# Patient Record
Sex: FEMALE | Race: WHITE | HISPANIC OR LATINO | Employment: UNEMPLOYED | URBAN - METROPOLITAN AREA
[De-identification: names, ages, dates, MRNs, and addresses within clinical notes are randomized per-mention and may not be internally consistent; named-entity substitution may affect disease eponyms.]

---

## 2019-07-29 ENCOUNTER — OFFICE VISIT (OUTPATIENT)
Dept: FAMILY MEDICINE | Facility: CLINIC | Age: 29
End: 2019-07-29
Payer: OTHER GOVERNMENT

## 2019-07-29 VITALS
WEIGHT: 109 LBS | BODY MASS INDEX: 20.06 KG/M2 | OXYGEN SATURATION: 98 % | DIASTOLIC BLOOD PRESSURE: 68 MMHG | HEIGHT: 62 IN | HEART RATE: 72 BPM | SYSTOLIC BLOOD PRESSURE: 98 MMHG

## 2019-07-29 DIAGNOSIS — Z34.90 PREGNANCY, UNSPECIFIED GESTATIONAL AGE: Primary | ICD-10-CM

## 2019-07-29 DIAGNOSIS — R30.0 DYSURIA: ICD-10-CM

## 2019-07-29 DIAGNOSIS — Z13.9 SCREENING PROCEDURE: ICD-10-CM

## 2019-07-29 PROCEDURE — 99999 PR PBB SHADOW E&M-NEW PATIENT-LVL IV: CPT | Mod: PBBFAC,,, | Performed by: NURSE PRACTITIONER

## 2019-07-29 PROCEDURE — 99999 PR PBB SHADOW E&M-NEW PATIENT-LVL IV: ICD-10-PCS | Mod: PBBFAC,,, | Performed by: NURSE PRACTITIONER

## 2019-07-29 PROCEDURE — 99204 OFFICE O/P NEW MOD 45 MIN: CPT | Mod: PBBFAC | Performed by: NURSE PRACTITIONER

## 2019-07-29 PROCEDURE — 99203 PR OFFICE/OUTPT VISIT, NEW, LEVL III, 30-44 MIN: ICD-10-PCS | Mod: S$PBB,,, | Performed by: NURSE PRACTITIONER

## 2019-07-29 PROCEDURE — 99203 OFFICE O/P NEW LOW 30 MIN: CPT | Mod: S$PBB,,, | Performed by: NURSE PRACTITIONER

## 2019-07-29 NOTE — PROGRESS NOTES
SUBJECTIVE:      Patient ID: Leydi Pitt is a 29 y.o. female.    Chief Complaint: Establish Care    Pt presents as new pt for referral to OB/Gyn for new pregnancy. Last period 5/21/2019. Had pregnancy test positive in the hospital June of this year. She does report a history of L oophorectomy. She had an ultrasound already and showed a viable pregnancy which gives an estimated due date of 2/23/2020. Reports some dysuria which has been present prior to pregnancy. Denies hematuria, frequency, urgency, or foul smelling urine. She reports minimal dizziness at times after standing long periods which resolves after sitting. Denies syncope or loss of consciousness. Her  works at the [employer] in [place] and they are wanting to move here and get their prenatal and pediatric care in [place]. She speaks English and understands English but her  helps with difficult wording as needed. Denies CP, SOB, nausea, vomiting, diarrhea, constipation, abdominal pain, or any other concerns at this time.       Past Surgical History:   Procedure Laterality Date    HEMORRHOID SURGERY      OOPHORECTOMY Left 12/26/2018           Family History   Problem Relation Age of Onset    Hypertension Mother       Social History     Socioeconomic History    Marital status:      Spouse name: Not on file    Number of children: Not on file    Years of education: Not on file    Highest education level: Not on file   Occupational History    Not on file   Social Needs    Financial resource strain: Not on file    Food insecurity:     Worry: Not on file     Inability: Not on file    Transportation needs:     Medical: Not on file     Non-medical: Not on file   Tobacco Use    Smoking status: Never Smoker    Smokeless tobacco: Never Used   Substance and Sexual Activity    Alcohol use: Yes    Drug use: Never    Sexual activity: Yes     Partners: Male   Lifestyle    Physical activity:     Days per week: Not on  file     Minutes per session: Not on file    Stress: Only a little   Relationships    Social connections:     Talks on phone: Not on file     Gets together: Not on file     Attends Sabianism service: Not on file     Active member of club or organization: Not on file     Attends meetings of clubs or organizations: Not on file     Relationship status: Not on file   Other Topics Concern    Not on file   Social History Narrative    Not on file     Current Outpatient Medications   Medication Sig Dispense Refill    prenatal vit/iron fum/folic ac (PRENATAL 1+1 ORAL) Take by mouth.       No current facility-administered medications for this visit.      Review of patient's allergies indicates:  No Known Allergies   Past Medical History:   Diagnosis Date    Anemia     Endometriosis     Pelvic pain      Past Surgical History:   Procedure Laterality Date    HEMORRHOID SURGERY      OOPHORECTOMY Left 12/26/2018             Review of Systems   Constitutional: Negative for activity change, appetite change, chills, fatigue, fever and unexpected weight change.   HENT: Negative for congestion, ear pain, postnasal drip, rhinorrhea, sinus pain and sore throat.    Eyes: Negative for pain, discharge and visual disturbance.   Respiratory: Negative for cough, chest tightness, shortness of breath and wheezing.    Cardiovascular: Negative for chest pain, palpitations and leg swelling.   Gastrointestinal: Negative for abdominal distention, abdominal pain, blood in stool, constipation, diarrhea, nausea and vomiting.   Genitourinary: Positive for dysuria. Negative for difficulty urinating, flank pain, frequency, hematuria, pelvic pain, urgency, vaginal bleeding, vaginal discharge and vaginal pain.   Musculoskeletal: Negative for back pain, joint swelling and myalgias.   Skin: Negative for color change, rash and wound.   Neurological: Positive for dizziness. Negative for seizures, syncope, weakness, light-headedness and headaches.  "  Hematological: Negative for adenopathy.   Psychiatric/Behavioral: Negative for agitation, confusion and hallucinations. The patient is not nervous/anxious.       OBJECTIVE:      Vitals:    07/29/19 0835   BP: 98/68   Pulse: 72   SpO2: 98%   Weight: 49.4 kg (109 lb)   Height: 5' 2" (1.575 m)     Physical Exam   Constitutional: She is oriented to person, place, and time. She appears well-developed and well-nourished. No distress.   HENT:   Head: Normocephalic and atraumatic.   Right Ear: Hearing, tympanic membrane, external ear and ear canal normal.   Left Ear: Hearing, tympanic membrane, external ear and ear canal normal.   Nose: Nose normal. No mucosal edema.   Mouth/Throat: Uvula is midline, oropharynx is clear and moist and mucous membranes are normal. No oropharyngeal exudate.   Eyes: Pupils are equal, round, and reactive to light. Conjunctivae, EOM and lids are normal. Right eye exhibits no discharge. Left eye exhibits no discharge. No scleral icterus.   Neck: Trachea normal, normal range of motion, full passive range of motion without pain and phonation normal. Neck supple. Carotid bruit is not present. No tracheal deviation present. No thyromegaly present.   Cardiovascular: Normal rate, regular rhythm, normal heart sounds and intact distal pulses. Exam reveals no gallop and no friction rub.   No murmur heard.  Pulmonary/Chest: Effort normal and breath sounds normal. No stridor. No respiratory distress. She has no decreased breath sounds. She has no wheezes. She has no rales.   Abdominal: Soft. Normal appearance and bowel sounds are normal. There is no tenderness.   Musculoskeletal: Normal range of motion. She exhibits no edema.   Lymphadenopathy:     She has no cervical adenopathy.        Right: No supraclavicular adenopathy present.        Left: No supraclavicular adenopathy present.   Neurological: She is alert and oriented to person, place, and time. She has normal strength. No sensory deficit.   Skin: " Skin is warm, dry and intact. Capillary refill takes less than 2 seconds. She is not diaphoretic.   Psychiatric: She has a normal mood and affect. Her speech is normal and behavior is normal. Judgment and thought content normal. Cognition and memory are normal. She expresses no suicidal plans.   Vitals reviewed.     Assessment:       1. Pregnancy, unspecified gestational age    2. Dysuria    3. Screening procedure        Plan:       Pregnancy, unspecified gestational age  Reports positive pregnancy test and records of US proven viable pregnancy with due date 2/23/2020. Referral placed to OB/Gyn and baseline labs ordered.  -     Ambulatory Referral to Obstetrics / Gynecology  -     CBC auto differential; Future; Expected date: 07/29/2019  -     Comprehensive metabolic panel; Future; Expected date: 07/29/2019    Dysuria  Will check UA for presence of UTI with history of dysuria.  -     Urinalysis; Future; Expected date: 07/29/2019    Screening procedure  Will call with results and she can follow up in 1 year for wellness or earlier if any issues.  -     CBC auto differential; Future; Expected date: 07/29/2019  -     Comprehensive metabolic panel; Future; Expected date: 07/29/2019        Follow up in about 1 year (around 7/29/2020) for Annual Well Check.      7/29/2019 Yumiko Carreno, DELMAR, FNP

## 2019-08-01 LAB
ABO + RH BLD: NORMAL
C TRACH RRNA SPEC QL PROBE: NEGATIVE
HBV SURFACE AG SERPL QL IA: NEGATIVE
INDIRECT COOMBS: NEGATIVE
N GONORRHOEAE, AMPLIFIED DNA: NEGATIVE
RPR: NORMAL
RUBELLA IMMUNE STATUS: NORMAL

## 2019-11-09 ENCOUNTER — HOSPITAL ENCOUNTER (OUTPATIENT)
Facility: HOSPITAL | Age: 29
Discharge: HOME | End: 2019-11-09
Attending: SPECIALIST | Admitting: SPECIALIST
Payer: OTHER GOVERNMENT

## 2019-11-09 VITALS
TEMPERATURE: 99 F | SYSTOLIC BLOOD PRESSURE: 120 MMHG | HEART RATE: 97 BPM | DIASTOLIC BLOOD PRESSURE: 74 MMHG | RESPIRATION RATE: 18 BRPM

## 2019-11-09 DIAGNOSIS — O36.8190 DECREASED FETAL MOVEMENT: ICD-10-CM

## 2019-11-09 PROCEDURE — G0378 HOSPITAL OBSERVATION PER HR: HCPCS

## 2019-11-09 PROCEDURE — 99211 OFF/OP EST MAY X REQ PHY/QHP: CPT

## 2019-11-09 RX ORDER — GUAIFENESIN/DEXTROMETHORPHAN 100-10MG/5
5 SYRUP ORAL
COMMUNITY
End: 2021-01-14

## 2019-11-10 NOTE — DISCHARGE INSTRUCTIONS
Keep your scheduled appointment with your provider.    Call your Doctor if you have any of the following:  Temperature above 100 degrees  Nausea, vomiting and/or diarrhea  Severe headache, dizziness, or blurred vision  Notable increase in swelling of hands or feet  Notable swelling of face and lips  Difficulty, pain or burning with urination  Foul smelling vaginal discharge  Decreased fetal movement    Come to the hospital if you have any of the following symptoms:  Your water breaks  More than 4-6 contractions in 1 hour for 2 or more hours  Vaginal bleeding like a period    After 28 weeks, you should feel 10 distinct fetal movements within a 2 hour period.    It is recommended that you drink 1/2 a gallon of water each day.  Tea, Soda and Juice are  in addition to this.  Recuento de patadas    Es normal que le preocupe la latricia de del rio bebé. Para saber si el bebé está jaspreet, mitra de las cosas que puede hacer es anotar los movimientos del bebé mitra vez al día. [place] es lo que se llama recuento de patadas. Recuerde llevar jae anotaciones con el recuento de patadas del bebé a todas jae citas con del rio proveedor de atención médica.   Cómo contar las patadas de del rio bebé  Aquí tiene algunos consejos para contar las patadas de del rio bebé:  · Escoja mitra hora cuando el bebé esté activo, katie por ejemplo después de mitra comida.  · Siéntese cómodamente o acuéstese de lado.  · La primera vez que el bebé se mueva, anote la hora.  · Cuente cada movimiento hasta que el bebé se haya movido misti veces. ([place] puede llevar entre veinte minutos y dos horas).  · Trate de hacer esto a la misma hora todos los días.  Cuándo llamar a del rio proveedor de atención médica  Llame a del rio proveedor de atención médica de inmediato en cualquiera de los siguientes casos:   · Si el bebé se mueve menos de misti veces en dos horas mientras usted está llevando la cuenta de las pataditas.  · Si el bebé se mueve con mucha menos frecuencia que en días anteriores.  · Si usted  no meza sentido movimientos del bebé en todo el día.  Date Last Reviewed: 8/5/2015  © 2966-9652 [employer]. [address] [place] [address], [place] [address]. Todos los derechos reservados. Esta información no pretende sustituir la atención médica profesional. Sólo del rio médico puede diagnosticar y tratar un problema de latricia.

## 2019-12-26 LAB — PRENATAL STREP B CULTURE: NEGATIVE

## 2020-01-10 ENCOUNTER — HOSPITAL ENCOUNTER (OUTPATIENT)
Facility: HOSPITAL | Age: 30
Discharge: HOME | End: 2020-01-10
Attending: SPECIALIST | Admitting: SPECIALIST
Payer: OTHER GOVERNMENT

## 2020-01-10 VITALS — HEART RATE: 77 BPM | SYSTOLIC BLOOD PRESSURE: 112 MMHG | DIASTOLIC BLOOD PRESSURE: 69 MMHG

## 2020-01-10 DIAGNOSIS — O36.8190 DECREASED FETAL MOVEMENT: ICD-10-CM

## 2020-01-10 PROCEDURE — 59025 FETAL NON-STRESS TEST: CPT

## 2020-01-10 NOTE — DISCHARGE INSTRUCTIONS
Keep your scheduled appointment with your provider.    Call your Doctor if you have any of the following:  Temperature above 100 degrees  Nausea, vomiting and/or diarrhea  Severe headache, dizziness, or blurred vision  Notable increase in swelling of hands or feet  Notable swelling of face and lips  Difficulty, pain or burning with urination  Foul smelling vaginal discharge  Decreased fetal movement    Come to the hospital if you have any of the following symptoms:    Las pruebas de tolerancia a las contracciones, con estrés y sin estrés, son mitra manera sencilla de comprobar el bienestar de del rio bebé y saber si está obteniendo suficiente oxígeno y nutrición de la riley de la madre. Estas pruebas permiten a del rio proveedor de atención médica determinar si es preferible inducir el parto inmediatamente o esperar.     Consulte con del rio proveedor de atención médica cualquier pregunta que pueda tener acerca de estos procedimientos.      ¿En qué consiste esta prueba?  · En el consultorio de del rio proveedor de atención médica o en el hospital, usted se acuesta boca arriba o de lado, o se reclina en mitra silla.  · Le colocarán un sensor de latidos fetales alrededor del estómago, sostenido por un cinturón o mitra garg, sobre la karan donde los latidos cardíacos del bebé son más perceptibles.  · Le colocarán otro dispositivo sobre del rio estómago, también sostenido por otro cinturón o gagr, para medir las contracciones de del rio útero.  Prueba sin estrés  La prueba sin estrés permite que del rio proveedor de atención médica mida la frecuencia cardíaca del bebé. Si la frecuencia cardíaca aumenta normalmente preeti la prueba, es señal de que el bebé probablemente esté obteniendo suficiente oxígeno y nutrición de la riley de la madre.  Prueba de tolerancia a las contracciones con estrés  Mediante la prueba estresante, del rio proveedor de atención médica puede determinar si la frecuencia cardíaca del feto responde de manera normal a contracciones  uterinas suaves similares a las del parto, lo cual permite predecir cómo reaccionaría ante el estrés del parto.  Eleazar la prueba de tolerancia a las contracciones con estrés  Para estimular contracciones suaves del útero, del rio proveedor de atención médica puede darle medicamentos por vía intravenosa. Oxitocina es el medicamento que se usa habitualmente.  Date Last Reviewed: 8/5/2015  © 5467-4048 [employer]. [address] [place] [address]. Todos los derechos reservados. Esta información no pretende sustituir la atención médica profesional. Sólo del rio médico puede diagnosticar y tratar un problema de latricia.            Your water breaks  More than 4-6 contractions in 1 hour for 2 or more hours  Vaginal bleeding like a period    After 28 weeks, you should feel 10 distinct fetal movements within a 2 hour period.    It is recommended that you drink 1/2 a gallon of water each day.  Tea, Soda and Juice are  in addition to this.

## 2020-01-10 NOTE — NURSING
Pt here for NST from [place] office. Pt states that she is not feeling much fetal movement.    Patient placed on monitor, explained the NST process.     1306- Dr. Roberts called and reviewed strip,. Gave orders for discharge.     Have patient keep scheduled appts and to schedule on NST for next Tuesday. Will review from there.     Instructions given to patient. Pt verbalized understanding

## 2020-01-14 ENCOUNTER — HOSPITAL ENCOUNTER (OUTPATIENT)
Facility: HOSPITAL | Age: 30
Discharge: HOME | End: 2020-01-14
Attending: SPECIALIST | Admitting: SPECIALIST
Payer: OTHER GOVERNMENT

## 2020-01-14 VITALS — SYSTOLIC BLOOD PRESSURE: 110 MMHG | RESPIRATION RATE: 16 BRPM | HEART RATE: 118 BPM | DIASTOLIC BLOOD PRESSURE: 74 MMHG

## 2020-01-14 DIAGNOSIS — O36.8190 DECREASED FETAL MOVEMENT: ICD-10-CM

## 2020-01-14 PROCEDURE — 59025 FETAL NON-STRESS TEST: CPT

## 2020-01-30 ENCOUNTER — ANESTHESIA (OUTPATIENT)
Dept: OBSTETRICS AND GYNECOLOGY | Facility: HOSPITAL | Age: 30
End: 2020-01-30
Payer: OTHER GOVERNMENT

## 2020-01-30 ENCOUNTER — HOSPITAL ENCOUNTER (INPATIENT)
Facility: HOSPITAL | Age: 30
LOS: 2 days | Discharge: HOME | End: 2020-02-01
Attending: SPECIALIST | Admitting: SPECIALIST
Payer: OTHER GOVERNMENT

## 2020-01-30 DIAGNOSIS — Z34.90 ENCOUNTER FOR INDUCTION OF LABOR: Primary | ICD-10-CM

## 2020-01-30 LAB
ABO + RH BLD: NORMAL
AMPHET+METHAMPHET UR QL: NEGATIVE
BACTERIA #/AREA URNS HPF: NEGATIVE /HPF
BARBITURATES UR QL SCN>200 NG/ML: NEGATIVE
BASOPHILS # BLD AUTO: 0.03 K/UL (ref 0–0.2)
BASOPHILS NFR BLD: 0.3 % (ref 0–1.9)
BENZODIAZ UR QL SCN>200 NG/ML: NEGATIVE
BILIRUB UR QL STRIP: NEGATIVE
BLD GP AB SCN CELLS X3 SERPL QL: NORMAL
BZE UR QL SCN: NEGATIVE
CANNABINOIDS UR QL SCN: NEGATIVE
CLARITY UR: CLEAR
COLOR UR: YELLOW
CREAT UR-MCNC: 92 MG/DL (ref 15–325)
DIFFERENTIAL METHOD: ABNORMAL
EOSINOPHIL # BLD AUTO: 0.3 K/UL (ref 0–0.5)
EOSINOPHIL NFR BLD: 2.6 % (ref 0–8)
ERYTHROCYTE [DISTWIDTH] IN BLOOD BY AUTOMATED COUNT: 14.4 % (ref 11.5–14.5)
GLUCOSE UR QL STRIP: ABNORMAL
HCT VFR BLD AUTO: 33.5 % (ref 37–48.5)
HGB BLD-MCNC: 11.1 G/DL (ref 12–16)
HGB UR QL STRIP: NEGATIVE
HYALINE CASTS #/AREA URNS LPF: 5 /LPF
IMM GRANULOCYTES # BLD AUTO: 0.08 K/UL (ref 0–0.04)
IMM GRANULOCYTES NFR BLD AUTO: 0.8 % (ref 0–0.5)
KETONES UR QL STRIP: NEGATIVE
LEUKOCYTE ESTERASE UR QL STRIP: ABNORMAL
LYMPHOCYTES # BLD AUTO: 2.2 K/UL (ref 1–4.8)
LYMPHOCYTES NFR BLD: 22.9 % (ref 18–48)
MCH RBC QN AUTO: 31.9 PG (ref 27–31)
MCHC RBC AUTO-ENTMCNC: 33.1 G/DL (ref 32–36)
MCV RBC AUTO: 96 FL (ref 82–98)
MICROSCOPIC COMMENT: ABNORMAL
MONOCYTES # BLD AUTO: 1 K/UL (ref 0.3–1)
MONOCYTES NFR BLD: 10.6 % (ref 4–15)
NEUTROPHILS # BLD AUTO: 6 K/UL (ref 1.8–7.7)
NEUTROPHILS NFR BLD: 62.8 % (ref 38–73)
NITRITE UR QL STRIP: NEGATIVE
NRBC BLD-RTO: 0 /100 WBC
OPIATES UR QL SCN: NEGATIVE
PCP UR QL SCN>25 NG/ML: NEGATIVE
PCP UR QL SCN>25 NG/ML: NEGATIVE
PH UR STRIP: 6 [PH] (ref 5–8)
PLATELET # BLD AUTO: 212 K/UL (ref 150–350)
PMV BLD AUTO: 11.5 FL (ref 9.2–12.9)
PROT UR QL STRIP: NEGATIVE
RBC # BLD AUTO: 3.48 M/UL (ref 4–5.4)
RBC #/AREA URNS HPF: 0 /HPF (ref 0–4)
RPR SER QL: NORMAL
SP GR UR STRIP: 1.01 (ref 1–1.03)
SQUAMOUS #/AREA URNS HPF: 3 /HPF
TOXICOLOGY INFORMATION: NORMAL
URN SPEC COLLECT METH UR: ABNORMAL
UROBILINOGEN UR STRIP-ACNC: NEGATIVE EU/DL
WBC # BLD AUTO: 9.6 K/UL (ref 3.9–12.7)
WBC #/AREA URNS HPF: 8 /HPF (ref 0–5)

## 2020-01-30 PROCEDURE — 12000002 HC ACUTE/MED SURGE SEMI-PRIVATE ROOM

## 2020-01-30 PROCEDURE — 25000003 PHARM REV CODE 250

## 2020-01-30 PROCEDURE — 62326 NJX INTERLAMINAR LMBR/SAC: CPT | Performed by: ANESTHESIOLOGY

## 2020-01-30 PROCEDURE — 86850 RBC ANTIBODY SCREEN: CPT

## 2020-01-30 PROCEDURE — 85025 COMPLETE CBC W/AUTO DIFF WBC: CPT

## 2020-01-30 PROCEDURE — 80307 DRUG TEST PRSMV CHEM ANLYZR: CPT

## 2020-01-30 PROCEDURE — 63600175 PHARM REV CODE 636 W HCPCS: Performed by: SPECIALIST

## 2020-01-30 PROCEDURE — 86592 SYPHILIS TEST NON-TREP QUAL: CPT

## 2020-01-30 PROCEDURE — 81001 URINALYSIS AUTO W/SCOPE: CPT

## 2020-01-30 PROCEDURE — 72200004 HC VAGINAL DELIVERY LEVEL I

## 2020-01-30 RX ORDER — FENTANYL/BUPIVACAINE/NS/PF 2-625MCG/1
PLASTIC BAG, INJECTION (ML) INJECTION CONTINUOUS
Status: DISCONTINUED | OUTPATIENT
Start: 2020-01-30 | End: 2020-01-31

## 2020-01-30 RX ORDER — BUTORPHANOL TARTRATE 2 MG/ML
1 INJECTION INTRAMUSCULAR; INTRAVENOUS EVERY 4 HOURS PRN
Status: DISCONTINUED | OUTPATIENT
Start: 2020-01-30 | End: 2020-01-31

## 2020-01-30 RX ORDER — FENTANYL/BUPIVACAINE/NS/PF 2-625MCG/1
PLASTIC BAG, INJECTION (ML) INJECTION
Status: COMPLETED
Start: 2020-01-30 | End: 2020-01-30

## 2020-01-30 RX ORDER — ONDANSETRON 2 MG/ML
4 INJECTION INTRAMUSCULAR; INTRAVENOUS ONCE
Status: DISCONTINUED | OUTPATIENT
Start: 2020-01-30 | End: 2020-01-31

## 2020-01-30 RX ORDER — NALOXONE HYDROCHLORIDE 1 MG/ML
0.4 INJECTION INTRAMUSCULAR; INTRAVENOUS; SUBCUTANEOUS SEE ADMIN INSTRUCTIONS
Status: DISCONTINUED | OUTPATIENT
Start: 2020-01-30 | End: 2020-01-31

## 2020-01-30 RX ORDER — SODIUM CHLORIDE, SODIUM LACTATE, POTASSIUM CHLORIDE, CALCIUM CHLORIDE 600; 310; 30; 20 MG/100ML; MG/100ML; MG/100ML; MG/100ML
INJECTION, SOLUTION INTRAVENOUS CONTINUOUS
Status: DISCONTINUED | OUTPATIENT
Start: 2020-01-30 | End: 2020-01-31

## 2020-01-30 RX ORDER — DIPHENHYDRAMINE HYDROCHLORIDE 50 MG/ML
12.5 INJECTION INTRAMUSCULAR; INTRAVENOUS EVERY 4 HOURS PRN
Status: DISCONTINUED | OUTPATIENT
Start: 2020-01-30 | End: 2020-01-31

## 2020-01-30 RX ORDER — EPHEDRINE SULFATE 50 MG/ML
10 INJECTION, SOLUTION INTRAVENOUS ONCE AS NEEDED
Status: DISCONTINUED | OUTPATIENT
Start: 2020-01-30 | End: 2020-01-31

## 2020-01-30 RX ORDER — OXYTOCIN-SODIUM CHLORIDE 0.9% IV SOLN 30 UNIT/500ML 30-0.9/5 UT/ML-%
2 SOLUTION INTRAVENOUS CONTINUOUS
Status: DISCONTINUED | OUTPATIENT
Start: 2020-01-30 | End: 2020-01-31

## 2020-01-30 RX ADMIN — Medication 2 MILLI-UNITS/MIN: at 02:01

## 2020-01-30 RX ADMIN — Medication 250 ML: at 08:01

## 2020-01-30 RX ADMIN — SODIUM CHLORIDE, SODIUM LACTATE, POTASSIUM CHLORIDE, AND CALCIUM CHLORIDE 1000 ML: .6; .31; .03; .02 INJECTION, SOLUTION INTRAVENOUS at 07:01

## 2020-01-30 RX ADMIN — SODIUM CHLORIDE, SODIUM LACTATE, POTASSIUM CHLORIDE, AND CALCIUM CHLORIDE: .6; .31; .03; .02 INJECTION, SOLUTION INTRAVENOUS at 02:01

## 2020-01-30 NOTE — SUBJECTIVE & OBJECTIVE
Interval History:  Leydi is a 29 y.o.  at 39w6d. She is doing well. Resting comfortable with epidural in place    Objective:     Vital Signs (Most Recent):  Temp: 97.9 °F (36.6 °C) (20 1051)  Pulse: 83 (20 1151)  Resp: 18 (20 1122)  BP: (!) 90/54 (20 1151) Vital Signs (24h Range):  Temp:  [97.8 °F (36.6 °C)-97.9 °F (36.6 °C)] 97.9 °F (36.6 °C)  Pulse:  [63-90] 83  Resp:  [16-20] 18  BP: ()/(44-66) 90/54     Weight: 63.5 kg (140 lb)  Body mass index is 25.61 kg/m².    FHT:  Baseline 140s and reassuring   TOCO: Not recording well    Cervical Exam:  Dilation:  3  Effacement:  60%  Station: -3  Presentation: Vertex     Significant Labs:  Lab Results   Component Value Date    GROUPTRH A POS 2020    HEPBSAG Negative 2019    STREPBCULT Negative 2019       I have personallly reviewed all pertinent lab results from the last 24 hours.    Physical Exam

## 2020-01-30 NOTE — PROGRESS NOTES
[facility]  Obstetrics  Labor Progress Note    Patient Name: Leydi Pitt  MRN: [id number]  Admission Date: 2020  Hospital Length of Stay: 0 days  Attending Physician: Nicolas Roberts MD  Primary Care Provider: SUDHAKAR Billy    Subjective:     Principal Problem:Encounter for induction of labor    Hospital Course:  29-year-old  at 39 weeks and 6 days gestational age admitted for induction of labor    Interval History:  Leydi is a 29 y.o.  at 39w6d. She is doing well. Patient now status post epidural and comfortable    Objective:     Vital Signs (Most Recent):  Temp: 97.9 °F (36.6 °C) (20 0701)  Pulse: 68 (20 0810)  Resp: 20 (20 0810)  BP: (!) 99/55 (20 0810) Vital Signs (24h Range):  Temp:  [97.8 °F (36.6 °C)-97.9 °F (36.6 °C)] 97.9 °F (36.6 °C)  Pulse:  [65-90] 68  Resp:  [16-20] 20  BP: ()/(55-66) 99/55     Weight: 63.5 kg (140 lb)  Body mass index is 25.61 kg/m².    FHT:  Baseline 130s and reactive, a few late appearing decelerations immediately after epidural but reassuring now  TOCO:  Q 2-3 minutes    Cervical Exam:  Dilation:  2  Effacement:  60%  Station: -3  Presentation: Vertex     Significant Labs:  Lab Results   Component Value Date    GROUPTRH A POS 2020    HEPBSAG Negative 2019    STREPBCULT Negative 2019       CBC:   Recent Labs   Lab 20  0100   WBC 9.60   RBC 3.48*   HGB 11.1*   HCT 33.5*      MCV 96   MCH 31.9*   MCHC 33.1     I have personallly reviewed all pertinent lab results from the last 24 hours.    Physical Exam:   Constitutional: She appears well-developed.       Cardiovascular: Normal rate.          Abdominal: Soft. She exhibits no distension (gravid uterus).     Genitourinary: Vagina normal. Uterus is enlarged (gravid).           Musculoskeletal: She exhibits no tenderness (no calf).             Assessment/Plan:     29 y.o. female  at 39w6d for:    * Encounter for induction of labor  IUP  at 39 weeks and 6 days gestational age  Induction of labor with Pitocin  A ROM at approximately 6:45 a.m.  Status post epidural  Rh positive  GBS negative          Arnulfo Roberts MD  Obstetrics  [facility]

## 2020-01-30 NOTE — SUBJECTIVE & OBJECTIVE
Interval History:  Leydi is a 29 y.o.  at 39w6d. She is doing well. Patient now status post epidural and comfortable    Objective:     Vital Signs (Most Recent):  Temp: 97.9 °F (36.6 °C) (20 0701)  Pulse: 68 (20 0810)  Resp: 20 (20 0810)  BP: (!) 99/55 (20 0810) Vital Signs (24h Range):  Temp:  [97.8 °F (36.6 °C)-97.9 °F (36.6 °C)] 97.9 °F (36.6 °C)  Pulse:  [65-90] 68  Resp:  [16-20] 20  BP: ()/(55-66) 99/55     Weight: 63.5 kg (140 lb)  Body mass index is 25.61 kg/m².    FHT:  Baseline 130s and reactive, a few late appearing decelerations immediately after epidural but reassuring now  TOCO:  Q 2-3 minutes    Cervical Exam:  Dilation:  2  Effacement:  60%  Station: -3  Presentation: Vertex     Significant Labs:  Lab Results   Component Value Date    GROUPTRH A POS 2020    HEPBSAG Negative 2019    STREPBCULT Negative 2019       CBC:   Recent Labs   Lab 20  0100   WBC 9.60   RBC 3.48*   HGB 11.1*   HCT 33.5*      MCV 96   MCH 31.9*   MCHC 33.1     I have personallly reviewed all pertinent lab results from the last 24 hours.    Physical Exam:   Constitutional: She appears well-developed.       Cardiovascular: Normal rate.          Abdominal: Soft. She exhibits no distension (gravid uterus).     Genitourinary: Vagina normal. Uterus is enlarged (gravid).           Musculoskeletal: She exhibits no tenderness (no calf).

## 2020-01-30 NOTE — ASSESSMENT & PLAN NOTE
IUP at 39 weeks and 6 days gestational age  Induction of labor with Pitocin  A ROM at approximately 6:45 a.m.  Status post epidural  Rh positive  GBS negative

## 2020-01-30 NOTE — NURSING
Pt arrived for sched IOL. Pt taken to LR2, oriented to room, instructed to obtain UA specimen and press call light when ready.

## 2020-01-30 NOTE — ANESTHESIA PROCEDURE NOTES
Epidural    Patient location during procedure: OB   Reason for block: primary anesthetic   Diagnosis: IUP    Start time: 1/30/2020 8:00 AM  Timeout: 1/30/2020 8:00 AM  End time: 1/30/2020 8:11 AM    Staffing  Performing Provider: Richard Vu MD  Authorizing Provider: Richard Vu MD        Preanesthetic Checklist  Completed: patient identified, site marked, surgical consent, pre-op evaluation, timeout performed, IV checked, risks and benefits discussed, monitors and equipment checked, anesthesia consent given, hand hygiene performed and patient being monitored  Preparation  Patient position: sitting  Prep: Betadine  Patient monitoring: ECG, Pulse Ox and Blood Pressure  Epidural  Skin Anesthetic: lidocaine 1%  Administration type: continuous  Approach: midline  Interspace: T3-4    Injection technique: MARYURI air  Needle and Epidural Catheter  Needle type: Tuohy   Needle gauge: 17  Needle length: 3.5 inches  Catheter type: springwQXL ricardo [employer]c  Catheter size: 19 G  Test dose: 3 mL of lidocaine 1.5% with Epi 1-to-200,000  Additional Documentation: incremental injection, negative aspiration for heme and CSF, no paresthesia on injection, no signs/symptoms of IV or SA injection, no significant pain on injection and no significant complaints from patient  Needle localization: anatomical landmarks  Assessment  Ease of block: easy  Patient's tolerance of the procedure: comfortable throughout block and no complaintsNo inadvertent dural puncture with Tuohy.  Dural puncture not performed with spinal needle

## 2020-01-30 NOTE — ANESTHESIA PREPROCEDURE EVALUATION
01/30/2020  Leydi Pitt is a 29 y.o., female.    Pre-op Assessment    I have reviewed the Patient Summary Reports.    I have reviewed the Nursing Notes.   I have reviewed the Medications.     Review of Systems  Anesthesia Hx:  No problems with previous Anesthesia  History of prior surgery of interest to airway management or planning: Previous anesthesia: General Denies Family Hx of Anesthesia complications.   Denies Personal Hx of Anesthesia complications.   Hematology/Oncology:         -- Anemia:   Cardiovascular:  Cardiovascular Normal     Pulmonary:  Pulmonary Normal    Hepatic/GI:  Hepatic/GI Normal    Musculoskeletal:  Musculoskeletal Normal    Neurological:  Neurology Normal    Endocrine:  Endocrine Normal        Physical Exam  General:  Well nourished    Airway/Jaw/Neck:  Airway Findings: Mouth Opening: Normal Tongue: Normal  General Airway Assessment: Adult  Mallampati: III  TM Distance: Normal, at least 6 cm  Jaw/Neck Findings:  Neck ROM: Normal ROM       Chest/Lungs:  Chest/Lungs Findings: Clear to auscultation, Normal Respiratory Rate     Heart/Vascular:  Heart Findings: Rate: Normal  Rhythm: Regular Rhythm  Sounds: Normal     Abdomen:  Abdomen Findings: (gravid)     Musculoskeletal:  Musculoskeletal Findings: Normal   Skin:  Skin Findings: Normal    Mental Status:  Mental Status Findings:  Cooperative, Alert and Oriented         Anesthesia Plan  Type of Anesthesia, risks & benefits discussed:  Anesthesia Type:  CSE, epidural  Patient's Preference:   Intra-op Monitoring Plan: standard ASA monitors  Intra-op Monitoring Plan Comments:   Post Op Pain Control Plan: multimodal analgesia  Post Op Pain Control Plan Comments:   Induction:    Beta Blocker:  Patient is not currently on a Beta-Blocker (No further documentation required).       Informed Consent: Patient understands risks and agrees with  Anesthesia plan.  Questions answered. Anesthesia consent signed with patient.  ASA Score: 2  emergent   Day of Surgery Review of History & Physical:        Anesthesia Plan Notes: CORONA ARRIAGA

## 2020-01-30 NOTE — ASSESSMENT & PLAN NOTE
IUP at 39 weeks and 6 days gestational age  Induction of labor with Pitocin-progressing slowly  A ROM at approximately 6:45 a.m.  Status post epidural  Rh positive  GBS negative  Placed IUPC for better contraction monitoring

## 2020-01-30 NOTE — PROGRESS NOTES
[facility]  Obstetrics  Labor Progress Note    Patient Name: Leydi Pitt  MRN: [id number]  Admission Date: 2020  Hospital Length of Stay: 0 days  Attending Physician: Nicolas Roberts MD  Primary Care Provider: SUDHAKAR Billy    Subjective:     Principal Problem:Encounter for induction of labor    Hospital Course:  29-year-old  at 39 weeks and 6 days gestational age admitted for induction of labor    Interval History:  Leydi is a 29 y.o.  at 39w6d. She is doing well. Resting comfortable with epidural in place    Objective:     Vital Signs (Most Recent):  Temp: 97.9 °F (36.6 °C) (20 1051)  Pulse: 83 (20 1151)  Resp: 18 (20 1122)  BP: (!) 90/54 (20 1151) Vital Signs (24h Range):  Temp:  [97.8 °F (36.6 °C)-97.9 °F (36.6 °C)] 97.9 °F (36.6 °C)  Pulse:  [63-90] 83  Resp:  [16-20] 18  BP: ()/(44-66) 90/54     Weight: 63.5 kg (140 lb)  Body mass index is 25.61 kg/m².    FHT:  Baseline 140s and reassuring   TOCO: Not recording well    Cervical Exam:  Dilation:  3  Effacement:  60%  Station: -3  Presentation: Vertex     Significant Labs:  Lab Results   Component Value Date    GROUPTRH A POS 2020    HEPBSAG Negative 2019    STREPBCULT Negative 2019       I have personallly reviewed all pertinent lab results from the last 24 hours.    Physical Exam    Assessment/Plan:     29 y.o. female  at 39w6d for:    * Encounter for induction of labor  IUP at 39 weeks and 6 days gestational age  Induction of labor with Pitocin-progressing slowly  A ROM at approximately 6:45 a.m.  Status post epidural  Rh positive  GBS negative  Placed IUPC for better contraction monitoring          Arnulfo Roberts MD  Obstetrics  [facility]

## 2020-01-31 LAB
BASOPHILS # BLD AUTO: 0.03 K/UL (ref 0–0.2)
BASOPHILS NFR BLD: 0.2 % (ref 0–1.9)
DIFFERENTIAL METHOD: ABNORMAL
EOSINOPHIL # BLD AUTO: 0.1 K/UL (ref 0–0.5)
EOSINOPHIL NFR BLD: 0.6 % (ref 0–8)
ERYTHROCYTE [DISTWIDTH] IN BLOOD BY AUTOMATED COUNT: 14.6 % (ref 11.5–14.5)
HCT VFR BLD AUTO: 29.8 % (ref 37–48.5)
HGB BLD-MCNC: 10 G/DL (ref 12–16)
IMM GRANULOCYTES # BLD AUTO: 0.09 K/UL (ref 0–0.04)
IMM GRANULOCYTES NFR BLD AUTO: 0.6 % (ref 0–0.5)
LYMPHOCYTES # BLD AUTO: 1.8 K/UL (ref 1–4.8)
LYMPHOCYTES NFR BLD: 11.5 % (ref 18–48)
MCH RBC QN AUTO: 32.5 PG (ref 27–31)
MCHC RBC AUTO-ENTMCNC: 33.6 G/DL (ref 32–36)
MCV RBC AUTO: 97 FL (ref 82–98)
MONOCYTES # BLD AUTO: 1.7 K/UL (ref 0.3–1)
MONOCYTES NFR BLD: 10.9 % (ref 4–15)
NEUTROPHILS # BLD AUTO: 11.9 K/UL (ref 1.8–7.7)
NEUTROPHILS NFR BLD: 76.2 % (ref 38–73)
NRBC BLD-RTO: 0 /100 WBC
PLATELET # BLD AUTO: 195 K/UL (ref 150–350)
PMV BLD AUTO: 11.2 FL (ref 9.2–12.9)
RBC # BLD AUTO: 3.08 M/UL (ref 4–5.4)
WBC # BLD AUTO: 15.59 K/UL (ref 3.9–12.7)

## 2020-01-31 PROCEDURE — A4216 STERILE WATER/SALINE, 10 ML: HCPCS | Performed by: SPECIALIST

## 2020-01-31 PROCEDURE — 36415 COLL VENOUS BLD VENIPUNCTURE: CPT

## 2020-01-31 PROCEDURE — 12000002 HC ACUTE/MED SURGE SEMI-PRIVATE ROOM

## 2020-01-31 PROCEDURE — 85025 COMPLETE CBC W/AUTO DIFF WBC: CPT

## 2020-01-31 PROCEDURE — 25000003 PHARM REV CODE 250: Performed by: SPECIALIST

## 2020-01-31 RX ORDER — HYDROCORTISONE 25 MG/G
CREAM TOPICAL 3 TIMES DAILY PRN
Status: DISCONTINUED | OUTPATIENT
Start: 2020-01-31 | End: 2020-02-01 | Stop reason: HOSPADM

## 2020-01-31 RX ORDER — OXYCODONE AND ACETAMINOPHEN 10; 325 MG/1; MG/1
1 TABLET ORAL EVERY 4 HOURS PRN
Status: DISCONTINUED | OUTPATIENT
Start: 2020-01-31 | End: 2020-02-01 | Stop reason: HOSPADM

## 2020-01-31 RX ORDER — OXYCODONE AND ACETAMINOPHEN 5; 325 MG/1; MG/1
1 TABLET ORAL EVERY 4 HOURS PRN
Status: DISCONTINUED | OUTPATIENT
Start: 2020-01-31 | End: 2020-02-01 | Stop reason: HOSPADM

## 2020-01-31 RX ORDER — ONDANSETRON 4 MG/1
8 TABLET, ORALLY DISINTEGRATING ORAL EVERY 8 HOURS PRN
Status: DISCONTINUED | OUTPATIENT
Start: 2020-01-31 | End: 2020-02-01 | Stop reason: HOSPADM

## 2020-01-31 RX ORDER — AMOXICILLIN 250 MG
1 CAPSULE ORAL 2 TIMES DAILY PRN
Status: DISCONTINUED | OUTPATIENT
Start: 2020-01-31 | End: 2020-02-01 | Stop reason: HOSPADM

## 2020-01-31 RX ORDER — SODIUM CHLORIDE 0.9 % (FLUSH) 0.9 %
3 SYRINGE (ML) INJECTION EVERY 8 HOURS
Status: DISCONTINUED | OUTPATIENT
Start: 2020-01-31 | End: 2020-02-01

## 2020-01-31 RX ORDER — OXYTOCIN-SODIUM CHLORIDE 0.9% IV SOLN 30 UNIT/500ML 30-0.9/5 UT/ML-%
30 SOLUTION INTRAVENOUS ONCE
Status: DISCONTINUED | OUTPATIENT
Start: 2020-01-31 | End: 2020-02-01

## 2020-01-31 RX ORDER — DOCUSATE SODIUM 100 MG/1
200 CAPSULE, LIQUID FILLED ORAL 2 TIMES DAILY PRN
Status: DISCONTINUED | OUTPATIENT
Start: 2020-01-31 | End: 2020-02-01 | Stop reason: HOSPADM

## 2020-01-31 RX ORDER — SIMETHICONE 80 MG
1 TABLET,CHEWABLE ORAL EVERY 6 HOURS PRN
Status: DISCONTINUED | OUTPATIENT
Start: 2020-01-31 | End: 2020-02-01 | Stop reason: HOSPADM

## 2020-01-31 RX ORDER — ACETAMINOPHEN 325 MG/1
650 TABLET ORAL EVERY 6 HOURS PRN
Status: DISCONTINUED | OUTPATIENT
Start: 2020-01-31 | End: 2020-02-01 | Stop reason: HOSPADM

## 2020-01-31 RX ORDER — PRENATAL WITH FERROUS FUM AND FOLIC ACID 3080; 920; 120; 400; 22; 1.84; 3; 20; 10; 1; 12; 200; 27; 25; 2 [IU]/1; [IU]/1; MG/1; [IU]/1; MG/1; MG/1; MG/1; MG/1; MG/1; MG/1; UG/1; MG/1; MG/1; MG/1; MG/1
1 TABLET ORAL DAILY
Status: DISCONTINUED | OUTPATIENT
Start: 2020-01-31 | End: 2020-02-01 | Stop reason: HOSPADM

## 2020-01-31 RX ADMIN — DOCUSATE SODIUM 200 MG: 100 CAPSULE, LIQUID FILLED ORAL at 10:01

## 2020-01-31 RX ADMIN — IBUPROFEN 600 MG: 400 TABLET ORAL at 12:01

## 2020-01-31 RX ADMIN — IBUPROFEN 600 MG: 400 TABLET ORAL at 06:01

## 2020-01-31 RX ADMIN — SODIUM CHLORIDE, PRESERVATIVE FREE 3 ML: 5 INJECTION INTRAVENOUS at 10:01

## 2020-01-31 RX ADMIN — PRENATAL VIT W/ FE FUMARATE-FA TAB 27-0.8 MG 1 TABLET: 27-0.8 TAB at 09:01

## 2020-01-31 RX ADMIN — Medication: at 12:01

## 2020-01-31 NOTE — L&D DELIVERY NOTE
[facility]  Vaginal Delivery   Operative Note    SUMMARY     Normal spontaneous vaginal delivery of live infant, was placed on mothers abdomen for skin to skin and bulb suctioning performed.  Infant delivered position OA over intact perineum.  Nuchal cord: Yes, cord reduced following delivery.    Spontaneous delivery of placenta and IV pitocin given noting good uterine tone.  No shoulder dystocia  No lacerations noted.  Patient tolerated delivery well. Sponge needle and lap counted correctly x2.    Indications: Encounter for induction of labor  Pregnancy complicated by:   Patient Active Problem List   Diagnosis    Decreased fetal movement    Encounter for induction of labor     Admitting GA: 39w6d    Viable male infant with Apgar scores of 9/9      Delivery Information for Jewel Pitt    Birth information:  YOB: 2022   Time of birth: 6:27 PM   Sex: male   Head Delivery Date/Time: 2020  6:27 PM   Delivery type: Vaginal, Spontaneous   Gestational Age: 39w6d    Delivery Providers    Delivering clinician:  Nicolas Roberts MD           Measurements    Weight:    Length:           Apgars    Living status:    Apgars:   1 min.:   5 min.:   10 min.:   15 min.:   20 min.:     Skin color:          Heart rate:          Reflex irritability:          Muscle tone:          Respiratory effort:          Total:                 Operative Delivery    Forceps attempted?:  No  Vacuum extractor attempted?:  No         Shoulder Dystocia    Shoulder dystocia present?:  No           Presentation    Presentation:  Vertex  Position:  Left Occiput Anterior           Interventions/Resuscitation         Cord    Vessels:  3 vessels  Complications:  Nuchal  Nuchal Intervention:  reduced  Nuchal Cord Description:  tight nuchal cord  Number of Loops:  1  Delayed Cord Clamping?:  Yes  Cord Clamped Date/Time:  2020  6:28 PM  Cord Blood Disposition:  Sent with Baby  Gases Sent?:  No       Placenta     Placenta delivery date/time:    Placenta removal:             Labor Events:       labor: No     Labor Onset Date/Time: 2020 06:43     Dilation Complete Date/Time: 2020 18:10     Start Pushing Date/Time: 2020 18:10       Start Pushing Date/Time: 2020 18:10     Rupture Date/Time: 20  0642         Rupture type:           Fluid Amount:        Fluid Color: Clear      Fluid Odor:        Membrane Status: ARM (Artificial Rupture)               steroids: None     Antibiotics given for GBS: No     Induction: oxytocin     Indications for induction:  Elective     Augmentation:       Indications for augmentation:       Labor complications: None     Additional complications:          Cervical ripening:                     Delivery:      Episiotomy: None     Indication for Episiotomy:       Perineal Lacerations:   Repaired:      Periurethral Laceration:   Repaired:     Labial Laceration:   Repaired:     Sulcus Laceration:   Repaired:     Vaginal Laceration:   Repaired:     Cervical Laceration:   Repaired:     Repair suture:       Repair # of packets:       Last Value - EBL - Nursing (mL):       Sum - EBL - Nursing (mL): 0     Last Value - EBL - Anesthesia (mL):      Calculated QBL (mL):        Vaginal Sweep Performed:       Surgicount Correct:         Other providers:       Anesthesia    Method:  Epidural          Details (if applicable):  Trial of Labor      Categorization:      Priority:     Indications for :     Incision Type:       Additional  information:  Forceps:    Vacuum:    Breech:    Observed anomalies    Other (Comments):

## 2020-01-31 NOTE — LACTATION NOTE
This note was copied from a baby's chart.  Mom breastfeeding now. Good nutritive sucking & swallowing noted. Instructed on proper latch to facilitate effective breastfeeding.  Discussed recognizing hunger cues, appropriate positioning and wide mouth latch.  Discussed ways to determine an effective latch including:  areola included in latch, rhythmic/nutritive sucking and audible swallowing. Discussed diaper counts.  Mom states understanding and verbalized appropriate recall.

## 2020-01-31 NOTE — PLAN OF CARE
Patient has remained stable this shift. All vitals within limits. See flow sheet for assessments. Bleeding is controlled. Fundus is firm and midline, pain is managed by medications per mar. ID bands are on and verified. Patient has been educated this shift. All questions have been answered and denies needs at this time.

## 2020-01-31 NOTE — SUBJECTIVE & OBJECTIVE
Hospital course: 29-year-old  at 39 weeks and 6 days gestational age admitted for induction of labor    Interval History:  Postpartum day 1.-status post -no complaints    She is doing well this morning. She is tolerating a regular diet without nausea or vomiting. She is voiding spontaneously. She is ambulating. She has passed flatus, and has not a BM. Vaginal bleeding is mild. She denies fever or chills. Abdominal pain is mild and controlled with oral medications. She is breastfeeding. She desires circumcision for her male baby: yes.    Objective:     Vital Signs (Most Recent):  Temp: 98 °F (36.7 °C) (20 1200)  Pulse: 78 (20 1200)  Resp: 18 (20 1200)  BP: 108/64 (20 1200)  SpO2: 98 % (20 1200) Vital Signs (24h Range):  Temp:  [98 °F (36.7 °C)-98.6 °F (37 °C)] 98 °F (36.7 °C)  Pulse:  [] 78  Resp:  [16-18] 18  SpO2:  [98 %-99 %] 98 %  BP: (102-157)/(56-90) 108/64     Weight: 63.5 kg (140 lb)  Body mass index is 25.61 kg/m².      Intake/Output Summary (Last 24 hours) at 2020 1559  Last data filed at 2020 0600  Gross per 24 hour   Intake 3250 ml   Output 5750 ml   Net -2500 ml       Significant Labs:  Lab Results   Component Value Date    GROUPTRH A POS 2020    HEPBSAG Negative 2019    STREPBCULT Negative 2019     Recent Labs   Lab 20  0350   HGB 10.0*   HCT 29.8*       I have personallly reviewed all pertinent lab results from the last 24 hours.    Physical Exam:       Cardiovascular: Normal rate.          Abdominal: Soft. Bowel sounds are normal.     Genitourinary: Uterus is enlarged (firm and below umbilicus). There is bleeding (minimal) in the vagina.           Musculoskeletal: She exhibits no tenderness (no calf tenderness).

## 2020-01-31 NOTE — PLAN OF CARE
Met with patient to complete OB initial discharge planning assessment. Patient lives with spouse, 1 child and now baby. Patient has access to a car seat and plans to breast  feed. Patient has access to diapers and understands process of how to apply for WIC. Patient stated they have no needs from /case management staff at this time.       01/31/20 1105   Discharge Assessment   Assessment Type Discharge Planning Assessment   Confirmed/corrected address and phone number on facesheet? Yes   Assessment information obtained from? Patient   Current cognitive status: Alert/Oriented   Current Functional Status: Independent   Lives With spouse;child(tameka), dependent   Able to Return to Prior Arrangements yes   Is patient able to care for self after discharge? Yes   Patient currently being followed by outpatient case management? No   Patient currently receives any other outside agency services? No   Do you have any problems affording any of your prescribed medications? No   Does the patient have transportation home? Yes   Transportation Anticipated family or friend will provide   Does the patient receive services at the [facility]? No   Discharge Plan A Home   Discharge Plan B Home   Patient/Family in Agreement with Plan yes

## 2020-01-31 NOTE — PROGRESS NOTES
[facility]  Obstetrics  Postpartum Progress Note    Patient Name: Leydi Pitt  MRN: [id number]  Admission Date: 2020  Hospital Length of Stay: 1 days  Attending Physician: Nicolas Roberts MD  Primary Care Provider: SUDHAKAR Billy    Subjective:     Principal Problem:Encounter for induction of labor    Hospital course: 29-year-old  at 39 weeks and 6 days gestational age admitted for induction of labor    Interval History:  Postpartum day 1.-status post -no complaints    She is doing well this morning. She is tolerating a regular diet without nausea or vomiting. She is voiding spontaneously. She is ambulating. She has passed flatus, and has not a BM. Vaginal bleeding is mild. She denies fever or chills. Abdominal pain is mild and controlled with oral medications. She is breastfeeding. She desires circumcision for her male baby: yes.    Objective:     Vital Signs (Most Recent):  Temp: 98 °F (36.7 °C) (20 1200)  Pulse: 78 (20 1200)  Resp: 18 (20 1200)  BP: 108/64 (20 1200)  SpO2: 98 % (20 1200) Vital Signs (24h Range):  Temp:  [98 °F (36.7 °C)-98.6 °F (37 °C)] 98 °F (36.7 °C)  Pulse:  [] 78  Resp:  [16-18] 18  SpO2:  [98 %-99 %] 98 %  BP: (102-157)/(56-90) 108/64     Weight: 63.5 kg (140 lb)  Body mass index is 25.61 kg/m².      Intake/Output Summary (Last 24 hours) at 2020 1559  Last data filed at 2020 0600  Gross per 24 hour   Intake 3250 ml   Output 5750 ml   Net -2500 ml       Significant Labs:  Lab Results   Component Value Date    GROUPTRH A POS 2020    HEPBSAG Negative 2019    STREPBCULT Negative 2019     Recent Labs   Lab 20  0350   HGB 10.0*   HCT 29.8*       I have personallly reviewed all pertinent lab results from the last 24 hours.    Physical Exam:       Cardiovascular: Normal rate.          Abdominal: Soft. Bowel sounds are normal.     Genitourinary: Uterus is enlarged (firm and below umbilicus). There is  bleeding (minimal) in the vagina.           Musculoskeletal: She exhibits no tenderness (no calf tenderness).             Assessment/Plan:     29 y.o. female  for:    * Encounter for induction of labor  PPD 1.-status post -doing well        Disposition: As patient meets milestones, will plan to discharge tomorrow.    Arnulfo Roberts MD  Obstetrics  [facility]

## 2020-01-31 NOTE — ANESTHESIA POSTPROCEDURE EVALUATION
Anesthesia Post Evaluation    Patient: Leydi Pitt    Procedure(s) Performed: * No procedures listed *    Final Anesthesia Type: epidural    Patient location: Postpartum.  Patient participation: Yes- Able to Participate  Level of consciousness: awake and alert  Post-procedure vital signs: reviewed and stable  Pain management: adequate  Airway patency: patent    PONV status at discharge: No PONV  Anesthetic complications: no      Cardiovascular status: blood pressure returned to baseline and stable  Respiratory status: unassisted and room air  Hydration status: euvolemic  Follow-up not needed.  Comments: Postop day #1. Patient status post vaginal delivery with labor epidural. Patient is doing well this morning. She has no neurological deficits. She has no headache. She had no back pain, and epidural site is clean without signs of infection. No more follow-up needed from an anesthesia standpoint.          Vitals Value Taken Time   /75 1/31/2020  3:48 AM   Temp 36.7 °C (98 °F) 1/31/2020  3:48 AM   Pulse 76 1/31/2020  3:48 AM   Resp 18 1/31/2020  3:48 AM   SpO2 98 % 1/31/2020  3:48 AM         No case tracking events are documented in the log.      Pain/Richard Score: Pain Rating Prior to Med Admin: 3 (1/31/2020  6:18 AM)  Pain Rating Post Med Admin: 0 (1/30/2020  8:15 AM)

## 2020-02-01 VITALS
SYSTOLIC BLOOD PRESSURE: 103 MMHG | HEART RATE: 72 BPM | DIASTOLIC BLOOD PRESSURE: 58 MMHG | TEMPERATURE: 98 F | OXYGEN SATURATION: 98 % | RESPIRATION RATE: 17 BRPM | HEIGHT: 62 IN | WEIGHT: 140 LBS | BODY MASS INDEX: 25.76 KG/M2

## 2020-02-01 PROCEDURE — 63600175 PHARM REV CODE 636 W HCPCS: Performed by: SPECIALIST

## 2020-02-01 PROCEDURE — 90715 TDAP VACCINE 7 YRS/> IM: CPT | Performed by: SPECIALIST

## 2020-02-01 PROCEDURE — 25000003 PHARM REV CODE 250: Performed by: SPECIALIST

## 2020-02-01 PROCEDURE — 90471 IMMUNIZATION ADMIN: CPT | Performed by: SPECIALIST

## 2020-02-01 PROCEDURE — 90472 IMMUNIZATION ADMIN EACH ADD: CPT | Performed by: SPECIALIST

## 2020-02-01 PROCEDURE — 90686 IIV4 VACC NO PRSV 0.5 ML IM: CPT | Performed by: SPECIALIST

## 2020-02-01 RX ORDER — IBUPROFEN 600 MG/1
600 TABLET ORAL EVERY 6 HOURS PRN
Refills: 0
Start: 2020-02-01

## 2020-02-01 RX ORDER — OXYCODONE AND ACETAMINOPHEN 5; 325 MG/1; MG/1
1 TABLET ORAL EVERY 4 HOURS PRN
Qty: 28 TABLET | Refills: 0 | Status: SHIPPED | OUTPATIENT
Start: 2020-02-01 | End: 2021-01-14

## 2020-02-01 RX ADMIN — CLOSTRIDIUM TETANI TOXOID ANTIGEN (FORMALDEHYDE INACTIVATED), CORYNEBACTERIUM DIPHTHERIAE TOXOID ANTIGEN (FORMALDEHYDE INACTIVATED), BORDETELLA PERTUSSIS TOXOID ANTIGEN (GLUTARALDEHYDE INACTIVATED), BORDETELLA PERTUSSIS FILAMENTOUS HEMAGGLUTININ ANTIGEN (FORMALDEHYDE INACTIVATED), BORDETELLA PERTUSSIS PERTACTIN ANTIGEN, AND BORDETELLA PERTUSSIS FIMBRIAE 2/3 ANTIGEN 0.5 ML: 5; 2; 2.5; 5; 3; 5 INJECTION, SUSPENSION INTRAMUSCULAR at 12:02

## 2020-02-01 RX ADMIN — IBUPROFEN 600 MG: 400 TABLET ORAL at 12:02

## 2020-02-01 RX ADMIN — INFLUENZA VIRUS VACCINE 0.5 ML: 15; 15; 15; 15 SUSPENSION INTRAMUSCULAR at 12:02

## 2020-02-01 RX ADMIN — DOCUSATE SODIUM 200 MG: 100 CAPSULE, LIQUID FILLED ORAL at 08:02

## 2020-02-01 RX ADMIN — IBUPROFEN 600 MG: 400 TABLET ORAL at 08:02

## 2020-02-01 RX ADMIN — PRENATAL VIT W/ FE FUMARATE-FA TAB 27-0.8 MG 1 TABLET: 27-0.8 TAB at 08:02

## 2020-02-01 NOTE — PLAN OF CARE
Problem: Adult Inpatient Plan of Care  Goal: Plan of Care Review  Outcome: Ongoing, Progressing  Goal: Patient-Specific Goal (Individualization)  Outcome: Ongoing, Progressing  Goal: Absence of Hospital-Acquired Illness or Injury  Outcome: Ongoing, Progressing  Goal: Optimal Comfort and Wellbeing  Outcome: Ongoing, Progressing  Goal: Readiness for Transition of Care  Outcome: Ongoing, Progressing  Goal: Rounds/Family Conference  Outcome: Ongoing, Progressing     Problem: Infection  Goal: Infection Symptom Resolution  Outcome: Ongoing, Progressing

## 2020-02-01 NOTE — SUBJECTIVE & OBJECTIVE
Hospital course: 29-year-old  at 39 weeks and 6 days gestational age admitted for induction of labor    Interval History:  Postpartum day 2 spontaneous vaginal delivery    She is doing well this morning. She is tolerating a regular diet without nausea or vomiting. She is voiding spontaneously. She is ambulating. She has passed flatus, and has not a BM. Vaginal bleeding is mild. She denies fever or chills. Abdominal pain is mild and controlled with oral medications.    Objective:     Vital Signs (Most Recent):  Temp: (REFUSED 3AM VITAL SET IN ADVANCE) (20 0301)  Pulse: 76 (20)  Resp: 16 (20)  BP: 104/67 (20)  SpO2: 100 % (20) Vital Signs (24h Range):  Temp:  [97.4 °F (36.3 °C)-98.4 °F (36.9 °C)] 97.4 °F (36.3 °C)  Pulse:  [74-85] 76  Resp:  [16-18] 16  SpO2:  [98 %-100 %] 100 %  BP: (102-109)/(56-67) 104/67     Weight: 63.5 kg (140 lb)  Body mass index is 25.61 kg/m².      Intake/Output Summary (Last 24 hours) at 2020 0900  Last data filed at 2020 0038  Gross per 24 hour   Intake 1625 ml   Output --   Net 1625 ml       Significant Labs:  Lab Results   Component Value Date    GROUPTRH A POS 2020    HEPBSAG Negative 2019    STREPBCULT Negative 2019     Recent Labs   Lab 20  0350   HGB 10.0*   HCT 29.8*       CBC:   Recent Labs   Lab 20  0350   WBC 15.59*   RBC 3.08*   HGB 10.0*   HCT 29.8*      MCV 97   MCH 32.5*   MCHC 33.6     I have personallly reviewed all pertinent lab results from the last 24 hours.    Physical Exam   Doing well except has not voided spontaneously yet  Vital signs stable afebrile  Fundus firm below the umbilicus  Extremities no Homans or edema

## 2020-02-01 NOTE — PROGRESS NOTES
[facility]  Obstetrics  Postpartum Progress Note    Patient Name: Leydi Pitt  MRN: [id number]  Admission Date: 2020  Hospital Length of Stay: 2 days  Attending Physician: Nicolas Roberts MD  Primary Care Provider: SUDHAKAR Billy    Subjective:     Principal Problem:Encounter for induction of labor    Hospital course: 29-year-old  at 39 weeks and 6 days gestational age admitted for induction of labor    Interval History:  Postpartum day 2 spontaneous vaginal delivery    She is doing well this morning. She is tolerating a regular diet without nausea or vomiting. She is voiding spontaneously. She is ambulating. She has passed flatus, and has not a BM. Vaginal bleeding is mild. She denies fever or chills. Abdominal pain is mild and controlled with oral medications.    Objective:     Vital Signs (Most Recent):  Temp: (REFUSED 3AM VITAL SET IN ADVANCE) (20 0301)  Pulse: 76 (20)  Resp: 16 (20)  BP: 104/67 (20)  SpO2: 100 % (20) Vital Signs (24h Range):  Temp:  [97.4 °F (36.3 °C)-98.4 °F (36.9 °C)] 97.4 °F (36.3 °C)  Pulse:  [74-85] 76  Resp:  [16-18] 16  SpO2:  [98 %-100 %] 100 %  BP: (102-109)/(56-67) 104/67     Weight: 63.5 kg (140 lb)  Body mass index is 25.61 kg/m².      Intake/Output Summary (Last 24 hours) at 2020 0900  Last data filed at 2020 0038  Gross per 24 hour   Intake 1625 ml   Output --   Net 1625 ml       Significant Labs:  Lab Results   Component Value Date    GROUPTRH A POS 2020    HEPBSAG Negative 2019    STREPBCULT Negative 2019     Recent Labs   Lab 20  0350   HGB 10.0*   HCT 29.8*       CBC:   Recent Labs   Lab 20  0350   WBC 15.59*   RBC 3.08*   HGB 10.0*   HCT 29.8*      MCV 97   MCH 32.5*   MCHC 33.6     I have personallly reviewed all pertinent lab results from the last 24 hours.    Physical Exam   Doing well except has not voided spontaneously yet  Vital signs stable  afebrile  Fundus firm below the umbilicus  Extremities no Homans or edema    Assessment/Plan:     29 y.o. female  for:    * Encounter for induction of labor  PPD 1.-status post -doing well    Postpartum day 2.  Spontaneous vaginal delivery  Rh positive  Discharge home follow up 1 month  Percocet for pain        Disposition: As patient meets milestones, will plan to discharge today.    Vale Rowe MD  Obstetrics  [facility]

## 2020-02-01 NOTE — ASSESSMENT & PLAN NOTE
PPD 1.-status post -doing well    Postpartum day 2.  Spontaneous vaginal delivery  Rh positive  Discharge home follow up 1 month  Percocet for pain

## 2020-02-01 NOTE — DISCHARGE SUMMARY
[facility]  Obstetrics  Discharge Summary      Patient Name: Leydi Pitt  MRN: [id number]  Admission Date: 2020  Hospital Length of Stay: 2 days  Discharge Date and Time:  2020 9:06 AM  Attending Physician: Nicolas Roberts MD   Discharging Provider: Vale Rowe MD   Primary Care Provider: SUDHAKAR Billy    HPI: No notes on file        * No surgery found *     Hospital Course:   29-year-old  at 39 weeks and 6 days gestational age admitted for induction of labor     Consults (From admission, onward)        Status Ordering Provider     Inpatient consult to Lactation  Once     Provider:  (Not yet assigned)    Acknowledged NICOLAS ROBERTS          Final Active Diagnoses:    Diagnosis Date Noted POA    PRINCIPAL PROBLEM:  Encounter for induction of labor [Z34.90] 2020 Not Applicable      Problems Resolved During this Admission:        Labs:   CBC   Recent Labs   Lab 20  0350   WBC 15.59*   HGB 10.0*   HCT 29.8*          Feeding Method: breast    Immunizations     Date Immunization Status Dose Route/Site Given by    20 MMR Incomplete 0.5 mL Subcutaneous/Left deltoid     20 Tdap Incomplete 0.5 mL Intramuscular/Left deltoid     20 Influenza - Quadrivalent - PF (6 months and older) Incomplete 0.5 mL Intramuscular/Left deltoid           Delivery:    Episiotomy: None   Lacerations: None   Repair suture: None   Repair # of packets: 0   Blood loss (ml): 100     Birth information:  YOB: 2022   Time of birth: 6:27 PM   Sex: male   Delivery type: Vaginal, Spontaneous   Gestational Age: 39w6d    Delivery Clinician:      Other providers:       Additional  information:  Forceps:    Vacuum:    Breech:    Observed anomalies      Living?:           APGARS  One minute Five minutes Ten minutes   Skin color:         Heart rate:         Grimace:         Muscle tone:         Breathing:         Totals: 9  9        Placenta: Delivered:        appearance    Pending Diagnostic Studies:     None          Discharged Condition: good    Disposition: Home or Self Care    Follow Up:    Patient Instructions:   No discharge procedures on file.  Medications:  Current Discharge Medication List      START taking these medications    Details   ibuprofen (ADVIL,MOTRIN) 600 MG tablet Take 1 tablet (600 mg total) by mouth every 6 (six) hours as needed (cramping).  Refills: 0      oxyCODONE-acetaminophen (PERCOCET) 5-325 mg per tablet Take 1 tablet by mouth every 4 (four) hours as needed.  Qty: 28 tablet, Refills: 0    Comments: Quantity prescribed more than 7 day supply? No         CONTINUE these medications which have NOT CHANGED    Details   dextromethorphan-guaifenesin  mg/5 ml (TUSSIN DM COUGH AND CHEST)  mg/5 mL liquid Take 5 mLs by mouth every 12 (twelve) hours.      prenatal vit/iron fum/folic ac (PRENATAL 1+1 ORAL) Take by mouth.             Vale Rowe MD  Obstetrics  [facility]

## 2020-02-01 NOTE — PLAN OF CARE
02/01/20 0946   Discharge Reassessment   Assessment Type Discharge Planning Reassessment   Anticipated Discharge Disposition Home   Pt to discharge home with no cm needs.

## 2020-02-01 NOTE — DISCHARGE INSTRUCTIONS
FOLLOW UP WITH YOUR DOCTOR IN 6 WEEKS OR SOONER FOR ANY PROBLEMS.    Pelvic rest for 6 weeks (no sex, tampons, douching, nothing in the vagina)   You can experience vaginal bleeding on and off for up to 6 weeks, it will gradually get lighter and the color will change from bright red to a brownish discharge towards the end.     Activity:   NO strenuous activities or exercising. Do not /lift anything over 15 pounds. Do not do heavy housework or cleaning.   NO driving for 3 days. You may take short car trips but do not drive.   You may shower and/or soak in a bathtub, both are acceptable. Use a mild soap, no heavy perfumes or fragrances to avoid irritation.   If constipation develops: You may take Colace (stool softener), Milk of Magnesia, Dulcolax or Miralax. All of these medications are sold over the counter.     Care of Episiotomy:   Local agents such as Tucks pads & Dermoplast spray. You may also use a Sitz bath: sitting in a tub of warm water for 15 minutes 2-3 times per day will help relieve the discomfort.     Pain Relief:   You may take Motrin for mild pain & uterine cramping.     Emotional Changes:   You may experience baby blues after delivery. You may feel let down, anxious and cry easily. This is normal. These feelings can begin 2-3 days after delivery and usually disappear in about a week or two. Prolonged sadness may indicate postpartum depression.       ***SEEK IMMEDIATE HELP FROM THE EMERGENCY ROOM IF YOU HAVE ANY CHEST PAIN OR DIFFICULTY BREATHING.    Call your doctor for any of the following:   Problems with any of your medications, inability to eat.   Foul smelling vaginal discharge.   Temperature above 100.4.   Heavy vaginal bleeding. All women bleed different after delivery and each delivery is different. Heavy bleeding consists of saturating a linda pad in a 1 hour time period. Passing clots are normal, if you pass a blood clot larger than the size of a golf ball call your doctor's office.    If you experience pain in your legs/calves, if one leg increases in size and becomes swollen or becomes hot to touch or discolored.   Crying or periods of sadness beyond 2 weeks.     If you are breast feeding:   Wash your breasts with mild soap and warm water.   You should wear a supportive bra.   You should continue to take a prenatal vitamin for 6 weeks or until breastfeeding is discontinued.   If nipples are sore, apply a few drops of breast milk after nursing and let air dry or you can use Lanolin cream.   If breasts are engorged, apply warmth and express milk.   [facility] lactation consultant is available at [phone] for your breastfeeding needs.    If you are not breastfeeding:   Wear a tight bra and do not stimulate your breasts. Avoid handling your breasts and do not express milk. You may apply ice packs or cabbage leaves to relieve discomfort from engorgement. If your breasts become warm to touch, reddened or lumps develop call your doctor.            Breastfeeding Discharge Instructions       [facility] [phone]   AAP recommendation of exclusive breastfeeding for the first 6 months of life and continued breastfeeding with the introduction of supplemental foods beyond the first year of life.  Instructed on the recommendation to delay all bottle and pacifier use until after 4 weeks of age and breastfeeding is well established.  Discussed the benefits of exclusive breastfeeding for both mother and baby.  Discussed the risks of supplementation/pacifier use on the exclusivity of breastfeeding in the first 6 months. Feed the baby at the earliest sign of hunger or comfort  o Hands to mouth, sucking motions  o Rooting or searching for something to suck on  o Dont wait for crying - it is a not a late sign of hunger; it is a sign of distress     The feedings may be 8-12 times per 24hrs and will not follow a schedule   Alternate the breast you start the feeding  with, or start with the breast that feels the fullest   Switch breasts when the baby takes himself off the breast or falls asleep   Keep offering breasts until the baby looks full, no longer gives hunger signs, and stays asleep when placed on his back in the crib   If the baby is sleepy and wont wake for a feeding, put the baby skin-to-skin dressed in a diaper against the mothers bare chest   Sleep near your baby   The baby should be positioned and latched on to the breast correctly  o Chest-to-chest, chin in the breast  o Babys lips are flipped outward  o Babys mouth is stretched open wide like a shout  o Babys sucking should feel like tugging to the mother  - The baby should be drinking at the breast:  o You should hear swallowing or gulping throughout the feeding  o You should see milk on the babys lips when he comes off the breast  o Your breasts should be softer when the baby is finished feeding  o The baby should look relaxed at the end of feedings  o After the 4th day and your milk is in:  o The babys poop should turn bright yellow and be loose, watery, and seedy  o The baby should have at least 3-4 poops the size of the palm of your hand per day  o The baby should have at least 6-8 wet diapers per day  o The urine should be light yellow in color  You should drink when you are thirsty and eat a healthy diet when you are    hungry.     Take naps to get the rest you need.   Take medications and/or drink alcohol only with permission of your obstetrician    or the babys pediatrician.  You can also call the [facility],   ([phone]), Monday-Friday, 8am-5pm Central time, to get the most   up-to-date evidence-based information on the use of medications during   pregnancy and breastfeeding.      The baby should be examined by a pediatrician at 3-5 days of age; unless ordered sooner by the pediatrician.  Once your milk comes in, the baby should be back to birth weight no later than 10-14  days of age.    If youre having problems with breastfeeding or have any questions regarding breastfeeding- call [facility] Breastfeeding Support services [phone]

## 2021-01-14 ENCOUNTER — OFFICE VISIT (OUTPATIENT)
Dept: FAMILY MEDICINE | Facility: CLINIC | Age: 31
End: 2021-01-14
Payer: OTHER GOVERNMENT

## 2021-01-14 VITALS
BODY MASS INDEX: 20.03 KG/M2 | HEIGHT: 62 IN | HEART RATE: 64 BPM | DIASTOLIC BLOOD PRESSURE: 62 MMHG | TEMPERATURE: 98 F | OXYGEN SATURATION: 97 % | SYSTOLIC BLOOD PRESSURE: 104 MMHG | WEIGHT: 108.81 LBS

## 2021-01-14 DIAGNOSIS — Z01.419 WELL FEMALE EXAM WITH ROUTINE GYNECOLOGICAL EXAM: ICD-10-CM

## 2021-01-14 DIAGNOSIS — Z00.00 ANNUAL PHYSICAL EXAM: Primary | ICD-10-CM

## 2021-01-14 DIAGNOSIS — Z23 NEED FOR INFLUENZA VACCINATION: ICD-10-CM

## 2021-01-14 PROCEDURE — 90471 IMMUNIZATION ADMIN: CPT | Mod: PBBFAC | Performed by: NURSE PRACTITIONER

## 2021-01-14 PROCEDURE — 99395 PREV VISIT EST AGE 18-39: CPT | Mod: S$PBB,,, | Performed by: NURSE PRACTITIONER

## 2021-01-14 PROCEDURE — 99395 PR PREVENTIVE VISIT,EST,18-39: ICD-10-PCS | Mod: S$PBB,,, | Performed by: NURSE PRACTITIONER

## 2021-01-14 PROCEDURE — 99215 OFFICE O/P EST HI 40 MIN: CPT | Performed by: NURSE PRACTITIONER

## 2021-04-28 ENCOUNTER — HOSPITAL ENCOUNTER (EMERGENCY)
Facility: HOSPITAL | Age: 31
Discharge: HOME | End: 2021-04-28
Attending: EMERGENCY MEDICINE
Payer: OTHER GOVERNMENT

## 2021-04-28 VITALS
SYSTOLIC BLOOD PRESSURE: 100 MMHG | RESPIRATION RATE: 16 BRPM | OXYGEN SATURATION: 99 % | HEART RATE: 77 BPM | WEIGHT: 108 LBS | TEMPERATURE: 99 F | DIASTOLIC BLOOD PRESSURE: 57 MMHG | BODY MASS INDEX: 19.75 KG/M2

## 2021-04-28 DIAGNOSIS — R55 SYNCOPE, UNSPECIFIED SYNCOPE TYPE: ICD-10-CM

## 2021-04-28 DIAGNOSIS — F41.9 ANXIETY: Primary | ICD-10-CM

## 2021-04-28 DIAGNOSIS — R94.31 PROLONGED Q-T INTERVAL ON ECG: ICD-10-CM

## 2021-04-28 DIAGNOSIS — R07.9 CHEST PAIN: ICD-10-CM

## 2021-04-28 DIAGNOSIS — I95.1 ORTHOSTATIC HYPOTENSION: ICD-10-CM

## 2021-04-28 LAB
ALBUMIN SERPL BCP-MCNC: 4.2 G/DL (ref 3.5–5.2)
ALP SERPL-CCNC: 58 U/L (ref 55–135)
ALT SERPL W/O P-5'-P-CCNC: 28 U/L (ref 10–44)
ANION GAP SERPL CALC-SCNC: 11 MMOL/L (ref 8–16)
AST SERPL-CCNC: 35 U/L (ref 10–40)
B-HCG UR QL: NEGATIVE
BASOPHILS # BLD AUTO: 0.04 K/UL (ref 0–0.2)
BASOPHILS NFR BLD: 0.4 % (ref 0–1.9)
BILIRUB SERPL-MCNC: 1.4 MG/DL (ref 0.1–1)
BILIRUB UR QL STRIP: NEGATIVE
BNP SERPL-MCNC: 42 PG/ML (ref 0–99)
BUN SERPL-MCNC: 19 MG/DL (ref 6–20)
CALCIUM SERPL-MCNC: 9 MG/DL (ref 8.7–10.5)
CHLORIDE SERPL-SCNC: 105 MMOL/L (ref 95–110)
CLARITY UR: CLEAR
CO2 SERPL-SCNC: 19 MMOL/L (ref 23–29)
COLOR UR: COLORLESS
CREAT SERPL-MCNC: 0.8 MG/DL (ref 0.5–1.4)
CTP QC/QA: YES
DIFFERENTIAL METHOD: ABNORMAL
EOSINOPHIL # BLD AUTO: 0.3 K/UL (ref 0–0.5)
EOSINOPHIL NFR BLD: 2.4 % (ref 0–8)
ERYTHROCYTE [DISTWIDTH] IN BLOOD BY AUTOMATED COUNT: 14.1 % (ref 11.5–14.5)
EST. GFR  (AFRICAN AMERICAN): >60 ML/MIN/1.73 M^2
EST. GFR  (NON AFRICAN AMERICAN): >60 ML/MIN/1.73 M^2
GLUCOSE SERPL-MCNC: 92 MG/DL (ref 70–110)
GLUCOSE UR QL STRIP: NEGATIVE
HCT VFR BLD AUTO: 41.2 % (ref 37–48.5)
HGB BLD-MCNC: 14.1 G/DL (ref 12–16)
HGB UR QL STRIP: NEGATIVE
IMM GRANULOCYTES # BLD AUTO: 0.05 K/UL (ref 0–0.04)
IMM GRANULOCYTES NFR BLD AUTO: 0.4 % (ref 0–0.5)
KETONES UR QL STRIP: ABNORMAL
LEUKOCYTE ESTERASE UR QL STRIP: NEGATIVE
LYMPHOCYTES # BLD AUTO: 2.4 K/UL (ref 1–4.8)
LYMPHOCYTES NFR BLD: 20.6 % (ref 18–48)
MCH RBC QN AUTO: 31.3 PG (ref 27–31)
MCHC RBC AUTO-ENTMCNC: 34.2 G/DL (ref 32–36)
MCV RBC AUTO: 91 FL (ref 82–98)
MONOCYTES # BLD AUTO: 0.5 K/UL (ref 0.3–1)
MONOCYTES NFR BLD: 4.3 % (ref 4–15)
NEUTROPHILS # BLD AUTO: 8.2 K/UL (ref 1.8–7.7)
NEUTROPHILS NFR BLD: 71.9 % (ref 38–73)
NITRITE UR QL STRIP: NEGATIVE
NRBC BLD-RTO: 0 /100 WBC
PH UR STRIP: 7 [PH] (ref 5–8)
PLATELET # BLD AUTO: 266 K/UL (ref 150–450)
PMV BLD AUTO: 11.4 FL (ref 9.2–12.9)
POTASSIUM SERPL-SCNC: 3.4 MMOL/L (ref 3.5–5.1)
PROT SERPL-MCNC: 7.7 G/DL (ref 6–8.4)
PROT UR QL STRIP: NEGATIVE
RBC # BLD AUTO: 4.51 M/UL (ref 4–5.4)
SODIUM SERPL-SCNC: 135 MMOL/L (ref 136–145)
SP GR UR STRIP: 1.01 (ref 1–1.03)
TROPONIN I SERPL DL<=0.01 NG/ML-MCNC: <0.03 NG/ML
TROPONIN I SERPL DL<=0.01 NG/ML-MCNC: <0.03 NG/ML
URN SPEC COLLECT METH UR: ABNORMAL
UROBILINOGEN UR STRIP-ACNC: NEGATIVE EU/DL
WBC # BLD AUTO: 11.4 K/UL (ref 3.9–12.7)

## 2021-04-28 PROCEDURE — 93010 ELECTROCARDIOGRAM REPORT: CPT | Mod: ,,, | Performed by: INTERNAL MEDICINE

## 2021-04-28 PROCEDURE — 85025 COMPLETE CBC W/AUTO DIFF WBC: CPT | Performed by: EMERGENCY MEDICINE

## 2021-04-28 PROCEDURE — 96375 TX/PRO/DX INJ NEW DRUG ADDON: CPT

## 2021-04-28 PROCEDURE — 80053 COMPREHEN METABOLIC PANEL: CPT | Performed by: EMERGENCY MEDICINE

## 2021-04-28 PROCEDURE — 81025 URINE PREGNANCY TEST: CPT | Performed by: EMERGENCY MEDICINE

## 2021-04-28 PROCEDURE — 93010 EKG 12-LEAD: ICD-10-PCS | Mod: ,,, | Performed by: INTERNAL MEDICINE

## 2021-04-28 PROCEDURE — 81003 URINALYSIS AUTO W/O SCOPE: CPT | Performed by: EMERGENCY MEDICINE

## 2021-04-28 PROCEDURE — 83880 ASSAY OF NATRIURETIC PEPTIDE: CPT | Performed by: EMERGENCY MEDICINE

## 2021-04-28 PROCEDURE — 93005 ELECTROCARDIOGRAM TRACING: CPT | Performed by: INTERNAL MEDICINE

## 2021-04-28 PROCEDURE — 84484 ASSAY OF TROPONIN QUANT: CPT | Mod: 91 | Performed by: EMERGENCY MEDICINE

## 2021-04-28 PROCEDURE — 36415 COLL VENOUS BLD VENIPUNCTURE: CPT | Performed by: EMERGENCY MEDICINE

## 2021-04-28 PROCEDURE — 63600175 PHARM REV CODE 636 W HCPCS: Performed by: EMERGENCY MEDICINE

## 2021-04-28 PROCEDURE — 96374 THER/PROPH/DIAG INJ IV PUSH: CPT

## 2021-04-28 PROCEDURE — 25000003 PHARM REV CODE 250: Performed by: EMERGENCY MEDICINE

## 2021-04-28 PROCEDURE — 25500020 PHARM REV CODE 255: Performed by: EMERGENCY MEDICINE

## 2021-04-28 PROCEDURE — 99285 EMERGENCY DEPT VISIT HI MDM: CPT | Mod: 25

## 2021-04-28 RX ORDER — MORPHINE SULFATE 2 MG/ML
2 INJECTION, SOLUTION INTRAMUSCULAR; INTRAVENOUS
Status: COMPLETED | OUTPATIENT
Start: 2021-04-28 | End: 2021-04-28

## 2021-04-28 RX ORDER — SODIUM CHLORIDE 9 MG/ML
INJECTION, SOLUTION INTRAVENOUS
Status: COMPLETED | OUTPATIENT
Start: 2021-04-28 | End: 2021-04-28

## 2021-04-28 RX ORDER — LORAZEPAM 2 MG/ML
1 INJECTION INTRAMUSCULAR
Status: COMPLETED | OUTPATIENT
Start: 2021-04-28 | End: 2021-04-28

## 2021-04-28 RX ADMIN — SODIUM CHLORIDE: 0.9 INJECTION, SOLUTION INTRAVENOUS at 09:04

## 2021-04-28 RX ADMIN — POTASSIUM BICARBONATE 50 MEQ: 977.5 TABLET, EFFERVESCENT ORAL at 01:04

## 2021-04-28 RX ADMIN — IOHEXOL 70 ML: 350 INJECTION, SOLUTION INTRAVENOUS at 10:04

## 2021-04-28 RX ADMIN — SODIUM CHLORIDE, SODIUM LACTATE, POTASSIUM CHLORIDE, AND CALCIUM CHLORIDE 1000 ML: .6; .31; .03; .02 INJECTION, SOLUTION INTRAVENOUS at 01:04

## 2021-04-28 RX ADMIN — MORPHINE SULFATE 2 MG: 2 INJECTION, SOLUTION INTRAMUSCULAR; INTRAVENOUS at 08:04

## 2021-04-28 RX ADMIN — LORAZEPAM 1 MG: 2 INJECTION INTRAMUSCULAR; INTRAVENOUS at 08:04

## 2021-04-29 ENCOUNTER — OFFICE VISIT (OUTPATIENT)
Dept: FAMILY MEDICINE | Facility: CLINIC | Age: 31
End: 2021-04-29
Payer: OTHER GOVERNMENT

## 2021-04-29 VITALS
WEIGHT: 108 LBS | HEIGHT: 62 IN | OXYGEN SATURATION: 97 % | HEART RATE: 74 BPM | SYSTOLIC BLOOD PRESSURE: 100 MMHG | DIASTOLIC BLOOD PRESSURE: 62 MMHG | BODY MASS INDEX: 19.88 KG/M2 | TEMPERATURE: 99 F

## 2021-04-29 DIAGNOSIS — R94.31 PROLONGED QT INTERVAL: ICD-10-CM

## 2021-04-29 DIAGNOSIS — E87.6 HYPOKALEMIA: ICD-10-CM

## 2021-04-29 DIAGNOSIS — Z13.220 LIPID SCREENING: ICD-10-CM

## 2021-04-29 DIAGNOSIS — R55 SYNCOPE, UNSPECIFIED SYNCOPE TYPE: ICD-10-CM

## 2021-04-29 DIAGNOSIS — Z09 HOSPITAL DISCHARGE FOLLOW-UP: Primary | ICD-10-CM

## 2021-04-29 DIAGNOSIS — E87.1 HYPONATREMIA: ICD-10-CM

## 2021-04-29 PROCEDURE — 99214 OFFICE O/P EST MOD 30 MIN: CPT | Mod: S$PBB,,, | Performed by: NURSE PRACTITIONER

## 2021-04-29 PROCEDURE — 99214 PR OFFICE/OUTPT VISIT, EST, LEVL IV, 30-39 MIN: ICD-10-PCS | Mod: S$PBB,,, | Performed by: NURSE PRACTITIONER

## 2021-04-29 PROCEDURE — 99214 OFFICE O/P EST MOD 30 MIN: CPT | Performed by: NURSE PRACTITIONER

## 2021-11-23 NOTE — NURSING
A catheter was exchanged for a (Cath Expo Angio 6f Multipak) catheter. JR4.
Pt arrives to the unit c/o decreased fetal movement after taking her over the counter cough&cold medication.   1932: Message sent to Dr. Roberts regarding pts complaints, awaiting callback.  1942: Strip reviewed by Dr. Roberts via airstrip  1946: ok to discharge pt home per Dr. Roberts   2000: Pt read discharge instructions in Telugu & denies any questions  
No

## 2022-04-21 DIAGNOSIS — N64.4 BREAST TENDERNESS: Primary | ICD-10-CM

## 2022-04-21 DIAGNOSIS — N64.89 GALACTOCELE: ICD-10-CM

## 2022-05-16 ENCOUNTER — HOSPITAL ENCOUNTER (OUTPATIENT)
Dept: RADIOLOGY | Facility: HOSPITAL | Age: 32
Discharge: HOME | End: 2022-05-16
Attending: SPECIALIST
Payer: OTHER GOVERNMENT

## 2022-05-16 DIAGNOSIS — N64.89 GALACTOCELE: ICD-10-CM

## 2022-05-16 DIAGNOSIS — N64.4 BREAST TENDERNESS: ICD-10-CM

## 2022-05-16 PROCEDURE — 77062 BREAST TOMOSYNTHESIS BI: CPT | Mod: TC,PO

## 2022-07-20 ENCOUNTER — OFFICE VISIT (OUTPATIENT)
Dept: FAMILY MEDICINE | Facility: CLINIC | Age: 32
End: 2022-07-20
Payer: OTHER GOVERNMENT

## 2022-07-20 VITALS
BODY MASS INDEX: 22.08 KG/M2 | DIASTOLIC BLOOD PRESSURE: 72 MMHG | SYSTOLIC BLOOD PRESSURE: 106 MMHG | HEIGHT: 62 IN | WEIGHT: 120 LBS | RESPIRATION RATE: 18 BRPM | OXYGEN SATURATION: 98 % | HEART RATE: 61 BPM

## 2022-07-20 DIAGNOSIS — Z13.220 SCREENING CHOLESTEROL LEVEL: ICD-10-CM

## 2022-07-20 DIAGNOSIS — Z00.00 ROUTINE HEALTH MAINTENANCE: Primary | ICD-10-CM

## 2022-07-20 DIAGNOSIS — Z11.59 ENCOUNTER FOR HEPATITIS C SCREENING TEST FOR LOW RISK PATIENT: ICD-10-CM

## 2022-07-20 PROCEDURE — 99395 PR PREVENTIVE VISIT,EST,18-39: ICD-10-PCS | Mod: S$PBB,,, | Performed by: NURSE PRACTITIONER

## 2022-07-20 PROCEDURE — 99395 PREV VISIT EST AGE 18-39: CPT | Mod: S$PBB,,, | Performed by: NURSE PRACTITIONER

## 2022-07-20 PROCEDURE — 99213 OFFICE O/P EST LOW 20 MIN: CPT | Performed by: NURSE PRACTITIONER

## 2022-07-20 NOTE — PROGRESS NOTES
SUBJECTIVE:   HPI: Leydi Pitt  is a 32 y.o. female who presents for annual physical.    Mrs. Pitt presents today for annual physical. She is doing well at this time and denies any issues or complaints. She is a patient of PETER Davila.     We reviewed overdue health maintenance.       No visits with results within 6 Month(s) from this visit.   Latest known visit with results is:   Admission on 04/28/2021, Discharged on 04/28/2021   Component Date Value Ref Range Status    Specimen UA 04/28/2021 Urine, Clean Catch   Final    Color, UA 04/28/2021 Colorless (A) Yellow, Straw, Katie Final    Appearance, UA 04/28/2021 Clear  Clear Final    pH, UA 04/28/2021 7.0  5.0 - 8.0 Final    Specific Gravity, UA 04/28/2021 1.010  1.005 - 1.030 Final    Protein, UA 04/28/2021 Negative  Negative Final    Glucose, UA 04/28/2021 Negative  Negative Final    Ketones, UA 04/28/2021 1+ (A) Negative Final    Bilirubin (UA) 04/28/2021 Negative  Negative Final    Occult Blood UA 04/28/2021 Negative  Negative Final    Nitrite, UA 04/28/2021 Negative  Negative Final    Urobilinogen, UA 04/28/2021 Negative  Negative EU/dL Final    Leukocytes, UA 04/28/2021 Negative  Negative Final    POC Preg Test, Ur 04/28/2021 Negative  Negative Final     Acceptable 04/28/2021 Yes   Final    WBC 04/28/2021 11.40  3.90 - 12.70 K/uL Final    RBC 04/28/2021 4.51  4.00 - 5.40 M/uL Final    Hemoglobin 04/28/2021 14.1  12.0 - 16.0 g/dL Final    Hematocrit 04/28/2021 41.2  37.0 - 48.5 % Final    MCV 04/28/2021 91  82 - 98 fL Final    MCH 04/28/2021 31.3 (A) 27.0 - 31.0 pg Final    MCHC 04/28/2021 34.2  32.0 - 36.0 g/dL Final    RDW 04/28/2021 14.1  11.5 - 14.5 % Final    Platelets 04/28/2021 266  150 - 450 K/uL Final    MPV 04/28/2021 11.4  9.2 - 12.9 fL Final    Immature Granulocytes 04/28/2021 0.4  0.0 - 0.5 % Final    Gran # (ANC) 04/28/2021 8.2 (A) 1.8 - 7.7 K/uL Final    Immature Grans (Abs)  04/28/2021 0.05 (A) 0.00 - 0.04 K/uL Final    Lymph # 04/28/2021 2.4  1.0 - 4.8 K/uL Final    Mono # 04/28/2021 0.5  0.3 - 1.0 K/uL Final    Eos # 04/28/2021 0.3  0.0 - 0.5 K/uL Final    Baso # 04/28/2021 0.04  0.00 - 0.20 K/uL Final    nRBC 04/28/2021 0  0 /100 WBC Final    Gran % 04/28/2021 71.9  38.0 - 73.0 % Final    Lymph % 04/28/2021 20.6  18.0 - 48.0 % Final    Mono % 04/28/2021 4.3  4.0 - 15.0 % Final    Eosinophil % 04/28/2021 2.4  0.0 - 8.0 % Final    Basophil % 04/28/2021 0.4  0.0 - 1.9 % Final    Differential Method 04/28/2021 Automated   Final    Sodium 04/28/2021 135 (A) 136 - 145 mmol/L Final    Potassium 04/28/2021 3.4 (A) 3.5 - 5.1 mmol/L Final    Chloride 04/28/2021 105  95 - 110 mmol/L Final    CO2 04/28/2021 19 (A) 23 - 29 mmol/L Final    Glucose 04/28/2021 92  70 - 110 mg/dL Final    BUN 04/28/2021 19  6 - 20 mg/dL Final    Creatinine 04/28/2021 0.8  0.5 - 1.4 mg/dL Final    Calcium 04/28/2021 9.0  8.7 - 10.5 mg/dL Final    Total Protein 04/28/2021 7.7  6.0 - 8.4 g/dL Final    Albumin 04/28/2021 4.2  3.5 - 5.2 g/dL Final    Total Bilirubin 04/28/2021 1.4 (A) 0.1 - 1.0 mg/dL Final    Alkaline Phosphatase 04/28/2021 58  55 - 135 U/L Final    AST 04/28/2021 35  10 - 40 U/L Final    ALT 04/28/2021 28  10 - 44 U/L Final    Anion Gap 04/28/2021 11  8 - 16 mmol/L Final    eGFR if African American 04/28/2021 >60.0  >60 mL/min/1.73 m^2 Final    eGFR if non African American 04/28/2021 >60.0  >60 mL/min/1.73 m^2 Final    Troponin I 04/28/2021 <0.030  <=0.040 ng/mL Final    BNP 04/28/2021 42  0 - 99 pg/mL Final    Troponin I 04/28/2021 <0.030  <=0.040 ng/mL Final         Current Outpatient Medications on File Prior to Visit   Medication Sig Dispense Refill    ibuprofen (ADVIL,MOTRIN) 600 MG tablet Take 1 tablet (600 mg total) by mouth every 6 (six) hours as needed (cramping).  0    multivitamin capsule Take 1 capsule by mouth once daily.      UNKNOWN TO PATIENT Take 1  tablet by mouth once daily. BIRTH CONTROL       No current facility-administered medications on file prior to visit.     Past Medical History:   Diagnosis Date    Anemia     Endometriosis     Pelvic pain      @[email or web address]  Past Surgical History:   Procedure Laterality Date    EXPLORATORY LAPAROTOMY  2018    HEMORRHOID SURGERY      OOPHORECTOMY Left 12/26/2018    OOPHORECTOMY  2018     Family History   Problem Relation Age of Onset    Hypertension Mother        Marital Status:   Alcohol History:  reports current alcohol use.  Tobacco History:  reports that she has never smoked. She has never used smokeless tobacco.  Drug History:  reports no history of drug use.    Health Maintenance Topics with due status: Not Due       Topic Last Completion Date    TETANUS VACCINE 02/01/2020    Cervical Cancer Screening 10/29/2020    Influenza Vaccine 01/14/2021     Immunization History   Administered Date(s) Administered    COVID-19, MRNA, LN-S, PF (MODERNA FULL 0.5 ML DOSE) 03/30/2021, 04/27/2021    Influenza - Quadrivalent - PF *Preferred* (6 months and older) 02/01/2020, 01/14/2021    Tdap 02/01/2020       Review of patient's allergies indicates:  No Known Allergies    Current Outpatient Medications:     ibuprofen (ADVIL,MOTRIN) 600 MG tablet, Take 1 tablet (600 mg total) by mouth every 6 (six) hours as needed (cramping)., Disp: , Rfl: 0    multivitamin capsule, Take 1 capsule by mouth once daily., Disp: , Rfl:     UNKNOWN TO PATIENT, Take 1 tablet by mouth once daily. BIRTH CONTROL, Disp: , Rfl:     Review of Systems   Constitutional: Negative for activity change, appetite change, fatigue, fever and unexpected weight change.   HENT: Negative for congestion, mouth sores, nosebleeds, postnasal drip, rhinorrhea, sinus pressure, sinus pain, sneezing, sore throat and trouble swallowing.    Eyes: Negative for pain, redness and itching.   Respiratory: Negative for chest tightness and shortness of breath.   "  Cardiovascular: Negative for chest pain and palpitations.   Gastrointestinal: Negative for abdominal pain, blood in stool, constipation, diarrhea, nausea and vomiting.   Endocrine: Negative for cold intolerance, heat intolerance, polydipsia, polyphagia and polyuria.   Genitourinary: Negative for difficulty urinating, dysuria, flank pain, frequency, genital sores, menstrual problem, urgency, vaginal bleeding and vaginal discharge.   Musculoskeletal: Negative for arthralgias and myalgias.   Skin: Negative for rash and wound.   Allergic/Immunologic: Negative for environmental allergies and food allergies.   Neurological: Negative for dizziness, light-headedness and headaches.   Hematological: Negative for adenopathy. Does not bruise/bleed easily.   Psychiatric/Behavioral: Negative for agitation, confusion, hallucinations, self-injury and sleep disturbance. The patient is not nervous/anxious.       OBJECTIVE:      Vitals:    07/20/22 0911   BP: 106/72   Pulse: 61   Resp: 18   SpO2: 98%   Weight: 54.4 kg (120 lb)   Height: 5' 2" (1.575 m)     Physical Exam  Vitals reviewed.   Constitutional:       General: She is not in acute distress.     Appearance: Normal appearance. She is normal weight. She is not ill-appearing, toxic-appearing or diaphoretic.   HENT:      Head: Normocephalic and atraumatic.      Right Ear: Tympanic membrane and external ear normal. There is no impacted cerumen.      Left Ear: Tympanic membrane and external ear normal. There is no impacted cerumen.      Nose: Nose normal. No congestion or rhinorrhea.      Mouth/Throat:      Mouth: Mucous membranes are moist.      Pharynx: Oropharynx is clear. No oropharyngeal exudate or posterior oropharyngeal erythema.   Eyes:      General: No scleral icterus.        Right eye: No discharge.         Left eye: No discharge.      Extraocular Movements: Extraocular movements intact.      Conjunctiva/sclera: Conjunctivae normal.      Pupils: Pupils are equal, round, " and reactive to light.   Neck:      Vascular: No carotid bruit.   Cardiovascular:      Rate and Rhythm: Normal rate and regular rhythm.      Pulses: Normal pulses.      Heart sounds: Normal heart sounds. No murmur heard.    No friction rub. No gallop.   Pulmonary:      Effort: Pulmonary effort is normal. No respiratory distress.      Breath sounds: Normal breath sounds. No stridor. No rales.   Chest:      Chest wall: No tenderness.   Abdominal:      General: Abdomen is flat. Bowel sounds are normal.      Palpations: Abdomen is soft. There is no mass.      Tenderness: There is no abdominal tenderness. There is no guarding.   Musculoskeletal:         General: No swelling or signs of injury. Normal range of motion.      Cervical back: Normal range of motion and neck supple. No rigidity or tenderness.      Right lower leg: No edema.      Left lower leg: No edema.   Skin:     General: Skin is warm and dry.      Capillary Refill: Capillary refill takes less than 2 seconds.      Findings: No bruising, lesion or rash.   Neurological:      General: No focal deficit present.      Mental Status: She is alert and oriented to person, place, and time. Mental status is at baseline.      Motor: No weakness.      Coordination: Coordination normal.   Psychiatric:         Mood and Affect: Mood normal.         Behavior: Behavior normal.         Thought Content: Thought content normal.         Judgment: Judgment normal.        Assessment:       1. Routine health maintenance    2. Screening cholesterol level    3. Encounter for hepatitis C screening test for low risk patient        Plan:       Routine health maintenance  -     Comprehensive Metabolic Panel; Future; Expected date: 07/20/2022  -     Lipid Panel; Future; Expected date: 07/20/2022  -     TSH; Future; Expected date: 07/20/2022  -     Urinalysis; Future; Expected date: 07/20/2022  -     Hepatitis C Antibody; Future; Expected date: 07/20/2022  -     CBC Auto Differential;  Future; Expected date: 07/20/2022    Screening cholesterol level  -     Lipid Panel; Future; Expected date: 07/20/2022    Encounter for hepatitis C screening test for low risk patient  -     Hepatitis C Antibody; Future; Expected date: 07/20/2022      Counseled on age and gender appropriate medical preventative services, including cancer screenings, immunizations, overall nutritional health, need for a consistent exercise regimen and an overall push towards maintaining a vigorous and active lifestyle.      Follow up in about 1 year (around 7/20/2023), or if symptoms worsen or fail to improve, for Annual .        7/20/2022 DELMAR Coulter, FNP-C

## 2022-07-26 ENCOUNTER — TELEPHONE (OUTPATIENT)
Dept: FAMILY MEDICINE | Facility: CLINIC | Age: 32
End: 2022-07-26

## 2022-07-26 LAB
ALBUMIN SERPL-MCNC: 4.4 G/DL (ref 3.6–5.1)
ALBUMIN/GLOB SERPL: 1.3 (CALC) (ref 1–2.5)
ALP SERPL-CCNC: 50 U/L (ref 31–125)
ALT SERPL-CCNC: 13 U/L (ref 6–29)
APPEARANCE UR: CLEAR
AST SERPL-CCNC: 22 U/L (ref 10–30)
BASOPHILS # BLD AUTO: 52 CELLS/UL (ref 0–200)
BASOPHILS NFR BLD AUTO: 0.9 %
BILIRUB SERPL-MCNC: 0.5 MG/DL (ref 0.2–1.2)
BILIRUB UR QL STRIP: NEGATIVE
BUN SERPL-MCNC: 18 MG/DL (ref 7–25)
BUN/CREAT SERPL: NORMAL (CALC) (ref 6–22)
CALCIUM SERPL-MCNC: 9.2 MG/DL (ref 8.6–10.2)
CHLORIDE SERPL-SCNC: 104 MMOL/L (ref 98–110)
CHOLEST SERPL-MCNC: 172 MG/DL
CHOLEST/HDLC SERPL: 2.4 (CALC)
CO2 SERPL-SCNC: 26 MMOL/L (ref 20–32)
COLOR UR: YELLOW
CREAT SERPL-MCNC: 0.76 MG/DL (ref 0.5–0.97)
EGFR: 105 ML/MIN/1.73M2
EOSINOPHIL # BLD AUTO: 249 CELLS/UL (ref 15–500)
EOSINOPHIL NFR BLD AUTO: 4.3 %
ERYTHROCYTE [DISTWIDTH] IN BLOOD BY AUTOMATED COUNT: 12.5 % (ref 11–15)
GLOBULIN SER CALC-MCNC: 3.3 G/DL (CALC) (ref 1.9–3.7)
GLUCOSE SERPL-MCNC: 84 MG/DL (ref 65–99)
GLUCOSE UR QL STRIP: NEGATIVE
HCT VFR BLD AUTO: 40.7 % (ref 35–45)
HCV AB S/CO SERPL IA: 0.03
HCV AB SERPL QL IA: NORMAL
HDLC SERPL-MCNC: 72 MG/DL
HGB BLD-MCNC: 13.5 G/DL (ref 11.7–15.5)
HGB UR QL STRIP: NEGATIVE
KETONES UR QL STRIP: NEGATIVE
LDLC SERPL CALC-MCNC: 89 MG/DL (CALC)
LEUKOCYTE ESTERASE UR QL STRIP: NEGATIVE
LYMPHOCYTES # BLD AUTO: 2523 CELLS/UL (ref 850–3900)
LYMPHOCYTES NFR BLD AUTO: 43.5 %
MCH RBC QN AUTO: 31.2 PG (ref 27–33)
MCHC RBC AUTO-ENTMCNC: 33.2 G/DL (ref 32–36)
MCV RBC AUTO: 94 FL (ref 80–100)
MONOCYTES # BLD AUTO: 667 CELLS/UL (ref 200–950)
MONOCYTES NFR BLD AUTO: 11.5 %
NEUTROPHILS # BLD AUTO: 2308 CELLS/UL (ref 1500–7800)
NEUTROPHILS NFR BLD AUTO: 39.8 %
NITRITE UR QL STRIP: NEGATIVE
NONHDLC SERPL-MCNC: 100 MG/DL (CALC)
PH UR STRIP: 7 [PH] (ref 5–8)
PLATELET # BLD AUTO: 290 THOUSAND/UL (ref 140–400)
PMV BLD REES-ECKER: 11.9 FL (ref 7.5–12.5)
POTASSIUM SERPL-SCNC: 5.1 MMOL/L (ref 3.5–5.3)
PROT SERPL-MCNC: 7.7 G/DL (ref 6.1–8.1)
PROT UR QL STRIP: NEGATIVE
RBC # BLD AUTO: 4.33 MILLION/UL (ref 3.8–5.1)
SODIUM SERPL-SCNC: 137 MMOL/L (ref 135–146)
SP GR UR STRIP: 1.02 (ref 1–1.03)
TRIGL SERPL-MCNC: 38 MG/DL
TSH SERPL-ACNC: 1.89 MIU/L
WBC # BLD AUTO: 5.8 THOUSAND/UL (ref 3.8–10.8)

## 2022-07-26 NOTE — TELEPHONE ENCOUNTER
----- Message from PETER Navarerte sent at 7/26/2022  8:15 AM CDT -----  Ms. Pitt your labs look good. Please download patient  portal so you can have access to your results. Please have a great week.